# Patient Record
Sex: FEMALE | Race: BLACK OR AFRICAN AMERICAN
[De-identification: names, ages, dates, MRNs, and addresses within clinical notes are randomized per-mention and may not be internally consistent; named-entity substitution may affect disease eponyms.]

---

## 2019-10-18 ENCOUNTER — TELEPHONE (OUTPATIENT)
Dept: SCHEDULING | Age: 29
End: 2019-10-18

## 2019-10-18 ENCOUNTER — WALK IN (OUTPATIENT)
Dept: URGENT CARE | Age: 29
End: 2019-10-18

## 2019-10-18 ENCOUNTER — APPOINTMENT (OUTPATIENT)
Dept: URGENT CARE | Age: 29
End: 2019-10-18

## 2019-10-18 DIAGNOSIS — Z23 NEED FOR VACCINATION: Primary | ICD-10-CM

## 2019-10-18 PROCEDURE — 90686 IIV4 VACC NO PRSV 0.5 ML IM: CPT | Performed by: NURSE PRACTITIONER

## 2019-10-18 PROCEDURE — 90471 IMMUNIZATION ADMIN: CPT | Performed by: NURSE PRACTITIONER

## 2020-10-07 ENCOUNTER — OFFICE VISIT (OUTPATIENT)
Dept: FAMILY MEDICINE CLINIC | Facility: CLINIC | Age: 30
End: 2020-10-07
Payer: COMMERCIAL

## 2020-10-07 VITALS
SYSTOLIC BLOOD PRESSURE: 132 MMHG | WEIGHT: 142 LBS | TEMPERATURE: 98 F | HEIGHT: 59.45 IN | HEART RATE: 68 BPM | BODY MASS INDEX: 28.25 KG/M2 | DIASTOLIC BLOOD PRESSURE: 88 MMHG

## 2020-10-07 DIAGNOSIS — Z11.8 SCREENING FOR CHLAMYDIAL DISEASE: ICD-10-CM

## 2020-10-07 DIAGNOSIS — Z00.00 LABORATORY EXAMINATION ORDERED AS PART OF A ROUTINE GENERAL MEDICAL EXAMINATION: ICD-10-CM

## 2020-10-07 DIAGNOSIS — L68.0 HIRSUTISM: ICD-10-CM

## 2020-10-07 DIAGNOSIS — Z12.4 SCREENING FOR MALIGNANT NEOPLASM OF CERVIX: ICD-10-CM

## 2020-10-07 DIAGNOSIS — Z11.3 SCREENING FOR GONORRHEA: ICD-10-CM

## 2020-10-07 DIAGNOSIS — Z01.419 WELL FEMALE EXAM WITH ROUTINE GYNECOLOGICAL EXAM: Primary | ICD-10-CM

## 2020-10-07 DIAGNOSIS — Z23 NEED FOR VACCINATION: ICD-10-CM

## 2020-10-07 DIAGNOSIS — E66.3 OVERWEIGHT (BMI 25.0-29.9): ICD-10-CM

## 2020-10-07 PROCEDURE — 3008F BODY MASS INDEX DOCD: CPT | Performed by: FAMILY MEDICINE

## 2020-10-07 PROCEDURE — 3079F DIAST BP 80-89 MM HG: CPT | Performed by: FAMILY MEDICINE

## 2020-10-07 PROCEDURE — 99385 PREV VISIT NEW AGE 18-39: CPT | Performed by: FAMILY MEDICINE

## 2020-10-07 PROCEDURE — 3075F SYST BP GE 130 - 139MM HG: CPT | Performed by: FAMILY MEDICINE

## 2020-10-07 PROCEDURE — 90715 TDAP VACCINE 7 YRS/> IM: CPT | Performed by: FAMILY MEDICINE

## 2020-10-07 PROCEDURE — 90471 IMMUNIZATION ADMIN: CPT | Performed by: FAMILY MEDICINE

## 2020-10-07 NOTE — PROGRESS NOTES
HPI:   Richie Flores  is a 31year old female who presents for a complete physical exam.   Doing [facility] PT for lower back pain helping her pain. Symptoms: denies discharge, itching, burning or dysuria, periods are regular.  .   Abnormal pap co : yes.  Children: no.   Exercise: minimal.  Diet: watches fats closely, watches sugar closely and vegetarian     REVIEW OF SYSTEMS:   GENERAL: denies fevers, weakness, trouble sleeping or weight changes  SKIN: denies any unusual skin lesions or rashe done   MUSCULOSKELETAL: gait fuentes,l no gross M/S defect. EXTREMITIES: no clubbing, cyanosis, or edema  NEURO: oriented times three, cranial nerves are grossly intact, no gross motor or sensory deficit.     ASSESSMENT AND PLAN:   Richie Flores complete physical yearly.

## 2020-10-09 NOTE — PROGRESS NOTES
Negative gonorrhea and Chlamydia testing. Pap smear is normal negative HPV. Call patient she is not currently on my chart.

## 2020-10-22 NOTE — PROGRESS NOTES
Lipid, testosterone, complete metabolic panel, thyroid testing and B12. Low hemoglobin has microcytic anemia add iron level and ferritin. Start ferrous sulfate @ 325 mg (65 mg of elemental iron) orally once to  twice per day if tolerated.   Take with vi

## 2020-10-23 ENCOUNTER — TELEPHONE (OUTPATIENT)
Dept: FAMILY MEDICINE CLINIC | Facility: CLINIC | Age: 30
End: 2020-10-23

## 2020-10-23 DIAGNOSIS — D64.9 LOW HEMOGLOBIN: Primary | ICD-10-CM

## 2020-10-23 NOTE — TELEPHONE ENCOUNTER
----- Message from Gabbie Crawford PA-C sent at 10/22/2020  5:48 PM CDT -----  Lipid, testosterone, complete metabolic panel, thyroid testing and B12. Low hemoglobin has microcytic anemia add iron level and ferritin.   Start ferrous sulfate @ 325 mg (6

## 2020-10-23 NOTE — TELEPHONE ENCOUNTER
Tests added through [facility].  Spoke to patient and all recommendations given to her. VU and is in agreement.

## 2020-10-27 NOTE — PROGRESS NOTES
Iron and iron storage levels are very low.   Low hemoglobin has microcytic anemia add iron level and ferritin.  Start ferrous sulfate @ 325 mg (65 mg of elemental iron) orally once to  twice per day if tolerated.  Take with vitamin C 500 mg to help absorpti

## 2020-10-30 ENCOUNTER — TELEPHONE (OUTPATIENT)
Dept: FAMILY MEDICINE CLINIC | Facility: CLINIC | Age: 30
End: 2020-10-30

## 2020-10-30 NOTE — TELEPHONE ENCOUNTER
Per Justin Rose PA-C, the patient's signed \"Test Authorization\" was successfully faxed to [employer] at [phone].

## 2020-11-27 NOTE — TELEPHONE ENCOUNTER
CBC is improving make sure you are taking 2 of the over-the-counter iron per day with 500 mg of vitamin C..  Next set of labs will be due 1/23/2021.   Sent to my chart  Make sure there is a CBC and a ferritin for 1/23/2021 ordered for 1/2021

## 2021-02-01 ENCOUNTER — TELEMEDICINE (OUTPATIENT)
Dept: FAMILY MEDICINE CLINIC | Facility: CLINIC | Age: 31
End: 2021-02-01

## 2021-02-01 ENCOUNTER — PATIENT MESSAGE (OUTPATIENT)
Dept: FAMILY MEDICINE CLINIC | Facility: CLINIC | Age: 31
End: 2021-02-01

## 2021-02-01 VITALS — SYSTOLIC BLOOD PRESSURE: 122 MMHG | DIASTOLIC BLOOD PRESSURE: 82 MMHG

## 2021-02-01 DIAGNOSIS — M54.41 ACUTE RIGHT-SIDED LOW BACK PAIN WITH RIGHT-SIDED SCIATICA: Primary | ICD-10-CM

## 2021-02-01 DIAGNOSIS — D50.8 IRON DEFICIENCY ANEMIA SECONDARY TO INADEQUATE DIETARY IRON INTAKE: ICD-10-CM

## 2021-02-01 DIAGNOSIS — L30.9 ECZEMA, UNSPECIFIED TYPE: ICD-10-CM

## 2021-02-01 PROCEDURE — 99214 OFFICE O/P EST MOD 30 MIN: CPT | Performed by: FAMILY MEDICINE

## 2021-02-01 RX ORDER — TIZANIDINE 2 MG/1
TABLET ORAL NIGHTLY PRN
Qty: 15 TABLET | Refills: 0 | Status: SHIPPED | OUTPATIENT
Start: 2021-02-01 | End: 2021-05-12

## 2021-02-01 RX ORDER — METHYLPREDNISOLONE 4 MG/1
TABLET ORAL
Qty: 1 KIT | Refills: 0 | Status: SHIPPED | OUTPATIENT
Start: 2021-02-01 | End: 2021-05-12 | Stop reason: ALTCHOICE

## 2021-02-01 NOTE — PATIENT INSTRUCTIONS
OTC Remedies for Pain   The products below can be combined some use 1-2 others use all of them.   Solanpas patch lidocaine patch OTC   TENS unit  Medrol Dosepak for 6 days as directed with food do not take over-the-counter ibuprofen or Aleve at the same blair

## 2021-02-01 NOTE — PROGRESS NOTES
Dann Lyman is a 27year old female. HPI:   Please note that the following visit was completed using two-way, real-time interactive audio and video communication.   This has been done in good kendra to provide continuity of care in the best int which did not cause prolonged discomfort. Presently using TENS unit, heating pad and over-the-counter medications not helping.      Eczema patches on the right arm in 2 different locations 1 by her hand the other 1 by her elbow has used some over-the-count COVID-19 pandemic. Reviewed medications, problem list and allergies. GENERAL: Patient is speaking in full sentences no increased work in breathing patient is alert and orientated x3.     Psych patient's mood is normal and communication skills are good storage (ferritin) try ferrous sulfate @ 325 mg (65 mg of elemental iron) orally twice a day. If side effects lower doses (eg, 15-20 mg of elemental iron daily) may be effective and cause fewer side effects. Take with vitamin C 500 mg to help absorption.

## 2021-02-01 NOTE — TELEPHONE ENCOUNTER
From: Jamey Olszewski  To:  Moose Schaffer PA-C  Sent: 2/1/2021 11:59 AM CST  Subject: Other    Hello, this is a follow up message to Elijah Martin, I saw her this morning for a Tele-med appointment and she asked me to get my blood pressure taken tod

## 2021-02-26 ENCOUNTER — PATIENT MESSAGE (OUTPATIENT)
Dept: FAMILY MEDICINE CLINIC | Facility: CLINIC | Age: 31
End: 2021-02-26

## 2021-02-26 NOTE — TELEPHONE ENCOUNTER
From: Agustina Vásquez  To: Bertis Litten, PA-C  Sent: 2/26/2021 2:24 PM CST  Subject: Other    Hi BODØ     I am long overdue with sending you this message. The medicine you prescribed for the back pain I was experiencing, worked.  [id number] [facility]

## 2021-03-19 ENCOUNTER — PATIENT MESSAGE (OUTPATIENT)
Dept: FAMILY MEDICINE CLINIC | Facility: CLINIC | Age: 31
End: 2021-03-19

## 2021-03-19 NOTE — TELEPHONE ENCOUNTER
From: Ortiz Tan  To: Vega Alves PA-C  Sent: 3/19/2021 11:54 AM CDT  Subject: Other    Hello,  This message is for BODØ. I wanted to follow up with her regarding the UTI I believe I had.  I had been drinking water and cranberry juic

## 2021-05-08 ENCOUNTER — PATIENT MESSAGE (OUTPATIENT)
Dept: FAMILY MEDICINE CLINIC | Facility: CLINIC | Age: 31
End: 2021-05-08

## 2021-05-10 NOTE — TELEPHONE ENCOUNTER
From: Rony Palbo  To: Ruby Shankar PA-C  Sent: 5/8/2021 7:37 AM CDT  Subject: Other    Gerardo Trejo    I want to let you know, I found out I am pregnant, yesterday! I will schedule an appointment to come and see you soon.

## 2021-05-12 ENCOUNTER — OFFICE VISIT (OUTPATIENT)
Dept: FAMILY MEDICINE CLINIC | Facility: CLINIC | Age: 31
End: 2021-05-12
Payer: COMMERCIAL

## 2021-05-12 VITALS
TEMPERATURE: 98 F | DIASTOLIC BLOOD PRESSURE: 80 MMHG | HEART RATE: 84 BPM | WEIGHT: 159 LBS | HEIGHT: 59.57 IN | SYSTOLIC BLOOD PRESSURE: 110 MMHG | BODY MASS INDEX: 31.63 KG/M2

## 2021-05-12 DIAGNOSIS — Z3A.01 LESS THAN 8 WEEKS GESTATION OF PREGNANCY: ICD-10-CM

## 2021-05-12 DIAGNOSIS — N91.2 AMENORRHEA: Primary | ICD-10-CM

## 2021-05-12 PROCEDURE — 3008F BODY MASS INDEX DOCD: CPT | Performed by: FAMILY MEDICINE

## 2021-05-12 PROCEDURE — 3079F DIAST BP 80-89 MM HG: CPT | Performed by: FAMILY MEDICINE

## 2021-05-12 PROCEDURE — 99213 OFFICE O/P EST LOW 20 MIN: CPT | Performed by: FAMILY MEDICINE

## 2021-05-12 PROCEDURE — 3074F SYST BP LT 130 MM HG: CPT | Performed by: FAMILY MEDICINE

## 2021-05-12 NOTE — PATIENT INSTRUCTIONS
Exercising After Pregnancy  Exercise is great for new moms. It can make your body stronger and give you more energy. It can improve your mood and your sleep. And it can help get rid of stress.  Read on for tips on how to exercise safely after having your target certain muscle groups. These include the arms, legs, and hips. Get your healthcare provider’s OK first. Then add these kinds of activities at least 2 days a week.  Examples include:  · Kegel pelvic floor muscle exercises (these help reduce urinary an discomfort of different kinds. Below are tips for ways to feel better. Talk with your healthcare provider before using pain-relieving medicine at any time during your pregnancy. First trimester tips  Easing nausea  · Get up slowly.  Eat a few unsalted c vegetables). · Drink plenty of water. · Get regular exercise. · Ask about your healthcare provider about medicines that have docusate or psyllium. Taking care of your breasts  · Don't use harsh soaps or alcohol, which can make your skin too dry.   · Wea you which ones to avoid. Fluids  Drink at least 8 to 10 cups of fluid daily. Your baby needs fluids. Fluids also decrease constipation, flush out toxins and waste, limit swelling, and help prevent bladder infections.  Water is best. Other good choices are: questions, be sure to ask them.    Fruits Vegetables Grains & Cereals* Fats & Oils   2 cups  Examples of 1-cup servings:   1 medium apple  1 medium orange  1 medium banana  1 cup chopped fruit  1 cup 100% fruit juice (pasteurized)   1/2 cup dried fruit 2-1/ need to take 400 micrograms (mcg) of folic acid every day for the first 2 to 3 months after conception. After that, 600 mcg is needed for a growing baby and placenta.   · Iron, calcium, and vitamin D. You may also be advised to take these supplements during Always follow your healthcare professional's instructions.

## 2021-05-13 PROBLEM — Z3A.01 LESS THAN 8 WEEKS GESTATION OF PREGNANCY: Status: ACTIVE | Noted: 2021-05-13

## 2021-05-13 PROBLEM — M54.41 ACUTE RIGHT-SIDED LOW BACK PAIN WITH RIGHT-SIDED SCIATICA: Status: RESOLVED | Noted: 2021-02-01 | Resolved: 2021-05-13

## 2021-05-13 PROBLEM — Z3A.01 LESS THAN 8 WEEKS GESTATION OF PREGNANCY (HCC): Status: ACTIVE | Noted: 2021-05-13

## 2021-05-13 PROBLEM — N91.2 AMENORRHEA: Status: ACTIVE | Noted: 2021-05-13

## 2021-05-13 NOTE — PROGRESS NOTES
Dale Adan is a 27year old female. HPI:   Patient is in for amenorrhea she is accompanied with her . .  First day of last menstrual period 4/1/2021. She presently is not on any medications.   States she did a home pregnancy test and i edema  Musculoskeletal: No gross deficit  Neurological: nerves II through XII grossly intact no sensorimotor deficit  Psychological: Mood and affect are normal.  Good communication skills.   ASSESSMENT AND PLAN:   Amenorrhea  (primary encounter diagnosis)

## 2021-05-24 ENCOUNTER — PATIENT MESSAGE (OUTPATIENT)
Dept: FAMILY MEDICINE CLINIC | Facility: CLINIC | Age: 31
End: 2021-05-24

## 2021-05-24 NOTE — TELEPHONE ENCOUNTER
From: Asha Beaver  To: Al Dean PA-C  Sent: 5/24/2021 3:17 PM CDT  Subject: Non-Urgent Medical Question    Hi Shannen Harrison!!    I am having trouble finding a doctor. I had been looking around since my last visit with you.  Jana Araya

## 2021-05-25 ENCOUNTER — TELEPHONE (OUTPATIENT)
Dept: OBGYN CLINIC | Facility: CLINIC | Age: 31
End: 2021-05-25

## 2021-05-25 NOTE — TELEPHONE ENCOUNTER
Call made to patient and she asked if I could call her work phone instead. Call made to patient's work phone but unable to contact patient directly. Hold for f/u.

## 2021-05-25 NOTE — TELEPHONE ENCOUNTER
Patient calling to initiate prenatal care  LMP 04/01/21  Patient is 7-8 weeks on 05/25/21  Confirmation Ultrasound and Appointment scheduled on 6/9/21  Insurance Cinthia   Good time to return phone call Anytime

## 2021-05-27 NOTE — TELEPHONE ENCOUNTER
Call to patient; no answer. Left message to call back.        LMP: 4/1/21  EDC based on lmp: 1/6/22    8 wks on 5/27; appt on 6/9 at 9 6/7 wks

## 2021-05-28 NOTE — TELEPHONE ENCOUNTER
Patient returned call.     LMP: 21  EDC based on lmp: 22     8 wks on ; appt on  at 9  wks        Are cycles regular?: yes    Medical problems: denies    Past sx hx: breast bx, no abd surgeries    Current medications: PNV    Past preg

## 2021-06-09 ENCOUNTER — ULTRASOUND ENCOUNTER (OUTPATIENT)
Dept: OBGYN CLINIC | Facility: CLINIC | Age: 31
End: 2021-06-09
Payer: COMMERCIAL

## 2021-06-09 ENCOUNTER — OFFICE VISIT (OUTPATIENT)
Dept: OBGYN CLINIC | Facility: CLINIC | Age: 31
End: 2021-06-09
Payer: COMMERCIAL

## 2021-06-09 VITALS
SYSTOLIC BLOOD PRESSURE: 138 MMHG | WEIGHT: 161.63 LBS | HEART RATE: 80 BPM | HEIGHT: 59 IN | DIASTOLIC BLOOD PRESSURE: 90 MMHG | BODY MASS INDEX: 32.58 KG/M2

## 2021-06-09 DIAGNOSIS — N91.2 AMENORRHEA: Primary | ICD-10-CM

## 2021-06-09 PROCEDURE — 76856 US EXAM PELVIC COMPLETE: CPT | Performed by: OBSTETRICS & GYNECOLOGY

## 2021-06-09 PROCEDURE — 3008F BODY MASS INDEX DOCD: CPT | Performed by: OBSTETRICS & GYNECOLOGY

## 2021-06-09 PROCEDURE — 99203 OFFICE O/P NEW LOW 30 MIN: CPT | Performed by: OBSTETRICS & GYNECOLOGY

## 2021-06-09 PROCEDURE — 3080F DIAST BP >= 90 MM HG: CPT | Performed by: OBSTETRICS & GYNECOLOGY

## 2021-06-09 PROCEDURE — 3075F SYST BP GE 130 - 139MM HG: CPT | Performed by: OBSTETRICS & GYNECOLOGY

## 2021-06-09 RX ORDER — PRENATAL VIT/IRON FUM/FOLIC AC 27MG-0.8MG
1 TABLET ORAL DAILY
COMMUNITY

## 2021-06-09 NOTE — PROGRESS NOTES
Patient is 26 y/o     Regular menses  Cat: 10 x 28-30 x 4-5    PMH: neg  PSH: right breast fibroadenoma removed    No history oh HTN, discussed BP, will watch    LMP: 2020  =  9w 6d     EDC: 2022    Scan: viable pregnancy, 10w 1d  C/w LMP

## 2021-06-24 ENCOUNTER — INITIAL PRENATAL (OUTPATIENT)
Dept: OBGYN CLINIC | Facility: CLINIC | Age: 31
End: 2021-06-24
Payer: COMMERCIAL

## 2021-06-24 VITALS
HEART RATE: 80 BPM | SYSTOLIC BLOOD PRESSURE: 140 MMHG | WEIGHT: 161.19 LBS | BODY MASS INDEX: 32.49 KG/M2 | DIASTOLIC BLOOD PRESSURE: 80 MMHG | HEIGHT: 59 IN

## 2021-06-24 DIAGNOSIS — Z34.90 ENCOUNTER FOR SUPERVISION OF NORMAL PREGNANCY, ANTEPARTUM, UNSPECIFIED GRAVIDITY: Primary | ICD-10-CM

## 2021-06-24 DIAGNOSIS — Z34.00 SUPERVISION OF NORMAL FIRST PREGNANCY, ANTEPARTUM: ICD-10-CM

## 2021-06-24 PROBLEM — Z3A.01 LESS THAN 8 WEEKS GESTATION OF PREGNANCY: Status: RESOLVED | Noted: 2021-05-13 | Resolved: 2021-06-24

## 2021-06-24 PROBLEM — N91.2 AMENORRHEA: Status: RESOLVED | Noted: 2021-05-13 | Resolved: 2021-06-24

## 2021-06-24 PROBLEM — Z3A.01 LESS THAN 8 WEEKS GESTATION OF PREGNANCY (HCC): Status: RESOLVED | Noted: 2021-05-13 | Resolved: 2021-06-24

## 2021-06-24 PROCEDURE — 3077F SYST BP >= 140 MM HG: CPT | Performed by: OBSTETRICS & GYNECOLOGY

## 2021-06-24 PROCEDURE — 3079F DIAST BP 80-89 MM HG: CPT | Performed by: OBSTETRICS & GYNECOLOGY

## 2021-06-24 PROCEDURE — 3008F BODY MASS INDEX DOCD: CPT | Performed by: OBSTETRICS & GYNECOLOGY

## 2021-06-24 PROCEDURE — 81002 URINALYSIS NONAUTO W/O SCOPE: CPT | Performed by: OBSTETRICS & GYNECOLOGY

## 2021-07-01 LAB
ABSOLUTE BASOPHILS: 21 CELLS/UL (ref 0–200)
ABSOLUTE EOSINOPHILS: 97 CELLS/UL (ref 15–500)
ABSOLUTE LYMPHOCYTES: 2084 CELLS/UL (ref 850–3900)
ABSOLUTE MONOCYTES: 538 CELLS/UL (ref 200–950)
ABSOLUTE NEUTROPHILS: 4161 CELLS/UL (ref 1500–7800)
BASOPHILS: 0.3 %
EOSINOPHILS: 1.4 %
HEMATOCRIT: 33.6 % (ref 35–45)
HEMOGLOBIN: 11.3 G/DL (ref 11.7–15.5)
LYMPHOCYTES: 30.2 %
MCH: 30.3 PG (ref 27–33)
MCHC: 33.6 G/DL (ref 32–36)
MCV: 90.1 FL (ref 80–100)
MONOCYTES: 7.8 %
MPV: 10 FL (ref 7.5–12.5)
NEUTROPHILS: 60.3 %
PLATELET COUNT: 229 THOUSAND/UL (ref 140–400)
RDW: 15.1 % (ref 11–15)
RED BLOOD CELL COUNT: 3.73 MILLION/UL (ref 3.8–5.1)
RUBELLA ANTIBODY (IGG): 2.97 INDEX
SICKLE CELL SCREEN: NEGATIVE
WHITE BLOOD CELL COUNT: 6.9 THOUSAND/UL (ref 3.8–10.8)

## 2021-07-13 ENCOUNTER — TELEPHONE (OUTPATIENT)
Dept: FAMILY MEDICINE CLINIC | Facility: CLINIC | Age: 31
End: 2021-07-13

## 2021-07-13 NOTE — TELEPHONE ENCOUNTER
Spoke with patient. Patient states that she has had flare ups of eczema in the past.  Last week, patient started with a patch of eczema on wrist.  Throughout the week, the rash has gotten progressively worse.   She has bumps between her breasts on on the t

## 2021-07-13 NOTE — TELEPHONE ENCOUNTER
Pt sent below [employer] message and would like to know if Casandra Peace recommends her reaching out to her OB or is there anything we can do for her? Maia Alarcon, thank you for getting back to me. I may have to reach out to my OB office.  I am 4 months pregnant, a

## 2021-07-15 ENCOUNTER — TELEPHONE (OUTPATIENT)
Dept: OBGYN CLINIC | Facility: CLINIC | Age: 31
End: 2021-07-15

## 2021-07-15 NOTE — TELEPHONE ENCOUNTER
Patient is having a rash outbreak and she called her primary to treat it. She just wants to make sure the medication she was told to take is safe.  Please advise

## 2021-07-15 NOTE — TELEPHONE ENCOUNTER
15W0D called stating she saw PCP for rash and he advised Claritin or Benadryl. She wanted to make sure it was safe to take in pregnancy.    Last OV:    Pregnancy Complications: NA  Abdominal pain: N  Leaking of fluid: N  Vaginal Bleeding: N

## 2021-07-21 PROBLEM — R03.0 ELEVATED BLOOD PRESSURE READING: Status: ACTIVE | Noted: 2021-07-21

## 2021-07-22 ENCOUNTER — ROUTINE PRENATAL (OUTPATIENT)
Dept: OBGYN CLINIC | Facility: CLINIC | Age: 31
End: 2021-07-22
Payer: COMMERCIAL

## 2021-07-22 ENCOUNTER — LAB ENCOUNTER (OUTPATIENT)
Dept: LAB | Age: 31
End: 2021-07-22
Attending: OBSTETRICS & GYNECOLOGY
Payer: COMMERCIAL

## 2021-07-22 VITALS — WEIGHT: 167.19 LBS | DIASTOLIC BLOOD PRESSURE: 76 MMHG | SYSTOLIC BLOOD PRESSURE: 136 MMHG | BODY MASS INDEX: 34 KG/M2

## 2021-07-22 DIAGNOSIS — Z3A.16 16 WEEKS GESTATION OF PREGNANCY: Primary | ICD-10-CM

## 2021-07-22 DIAGNOSIS — Z34.00 SUPERVISION OF NORMAL FIRST PREGNANCY: Primary | ICD-10-CM

## 2021-07-22 LAB
GLUCOSE (URINE DIPSTICK): NEGATIVE MG/DL
MULTISTIX LOT#: ABNORMAL NUMERIC
PROTEIN (URINE DIPSTICK): 30 MG/DL

## 2021-07-22 PROCEDURE — 81002 URINALYSIS NONAUTO W/O SCOPE: CPT | Performed by: OBSTETRICS & GYNECOLOGY

## 2021-07-22 PROCEDURE — 3078F DIAST BP <80 MM HG: CPT | Performed by: OBSTETRICS & GYNECOLOGY

## 2021-07-22 PROCEDURE — 3075F SYST BP GE 130 - 139MM HG: CPT | Performed by: OBSTETRICS & GYNECOLOGY

## 2021-07-22 NOTE — PROGRESS NOTES
Patient presents with:  Prenatal Care: AMI     Routine prenatal visit complaining of rash on arms and abdomen. Seeing dermatologist but no diagnosis as of yet. Patient denies any bleeding, leaking fluid, cramping, or contractions. Good fetal movement.   A

## 2021-07-27 ENCOUNTER — TELEPHONE (OUTPATIENT)
Dept: OBGYN CLINIC | Facility: CLINIC | Age: 31
End: 2021-07-27

## 2021-07-28 NOTE — TELEPHONE ENCOUNTER
Patient informed of normal results. She is having a gender reveal and asked if we could leave detailed message with her friend, Joselyn Graves. Left detailed message at [phone]. To call back w/ any questions or concerns.      In addition patient was seen a

## 2021-08-14 ENCOUNTER — MED REC SCAN ONLY (OUTPATIENT)
Dept: OBGYN CLINIC | Facility: CLINIC | Age: 31
End: 2021-08-14

## 2021-08-19 ENCOUNTER — ROUTINE PRENATAL (OUTPATIENT)
Dept: OBGYN CLINIC | Facility: CLINIC | Age: 31
End: 2021-08-19
Payer: COMMERCIAL

## 2021-08-19 ENCOUNTER — ULTRASOUND ENCOUNTER (OUTPATIENT)
Dept: OBGYN CLINIC | Facility: CLINIC | Age: 31
End: 2021-08-19
Payer: COMMERCIAL

## 2021-08-19 VITALS
WEIGHT: 166.81 LBS | DIASTOLIC BLOOD PRESSURE: 80 MMHG | HEIGHT: 59 IN | SYSTOLIC BLOOD PRESSURE: 120 MMHG | BODY MASS INDEX: 33.63 KG/M2

## 2021-08-19 DIAGNOSIS — Z34.00 SUPERVISION OF NORMAL FIRST PREGNANCY, ANTEPARTUM: Primary | ICD-10-CM

## 2021-08-19 LAB
GLUCOSE (URINE DIPSTICK): NEGATIVE MG/DL
MULTISTIX LOT#: NORMAL NUMERIC

## 2021-08-19 PROCEDURE — 3079F DIAST BP 80-89 MM HG: CPT | Performed by: OBSTETRICS & GYNECOLOGY

## 2021-08-19 PROCEDURE — 81002 URINALYSIS NONAUTO W/O SCOPE: CPT | Performed by: OBSTETRICS & GYNECOLOGY

## 2021-08-19 PROCEDURE — 3074F SYST BP LT 130 MM HG: CPT | Performed by: OBSTETRICS & GYNECOLOGY

## 2021-08-19 PROCEDURE — 3008F BODY MASS INDEX DOCD: CPT | Performed by: OBSTETRICS & GYNECOLOGY

## 2021-08-19 PROCEDURE — 76805 OB US >/= 14 WKS SNGL FETUS: CPT | Performed by: OBSTETRICS & GYNECOLOGY

## 2021-08-19 NOTE — PATIENT INSTRUCTIONS
Medications Safe in Pregnancy  The following over-the-counter medications may be taken safely after 12 weeks gestation by any patient who is pregnant. Please follow the instructions on the package for adult dosage.   If you experience any symptoms prior to had significant outbreaks of pertussis (whooping cough) for the last few years. Therefore, the CDC recommends that all pregnant women receive a Tdap vaccine during pregnancy, regardless of whether you have received one previously.   The vaccine reduces you Here are some facts to consider when you are making a decision:   [1] Pregnant women are at higher risk for acquiring Covid-19 infection and have a subsequent higher risk for the following:  - severe illness  - adverse pregnancy outcomes including pret

## 2021-08-19 NOTE — PROGRESS NOTES
AMI    GA: 20w0d   21  1312   BP: 120/80   Weight: 166 lb 12.8 oz (75.7 kg)   Height: 59\"       Doing well  No complaints.  Denies LOF/VB/uctx  RH positive  Genetic testing cffDNA neg, declines MSAFP   Anatomy Scan  unremarkable   CBC and

## 2021-08-26 ENCOUNTER — TELEPHONE (OUTPATIENT)
Dept: OBGYN CLINIC | Facility: CLINIC | Age: 31
End: 2021-08-26

## 2021-08-26 NOTE — TELEPHONE ENCOUNTER
G1/P 0 GA 21 wks patient complaining of abdominal pain. She states that her pain has improved. States that she believes it was gas. Symptoms improved after using the restroom.    Last OV: 8/19/21 tyron with Dr. Kun Santana    Pregnancy Complications: elevated BMI,

## 2021-08-26 NOTE — TELEPHONE ENCOUNTER
Patient called with gas or cramping. She would like to talk to a nurse to see what she can do.  Please advise

## 2021-09-11 ENCOUNTER — IMMUNIZATION (OUTPATIENT)
Dept: LAB | Facility: HOSPITAL | Age: 31
End: 2021-09-11
Attending: EMERGENCY MEDICINE
Payer: COMMERCIAL

## 2021-09-11 DIAGNOSIS — Z23 NEED FOR VACCINATION: Primary | ICD-10-CM

## 2021-09-11 PROCEDURE — 0001A SARSCOV2 VAC 30MCG/0.3ML IM: CPT

## 2021-09-16 ENCOUNTER — ROUTINE PRENATAL (OUTPATIENT)
Dept: OBGYN CLINIC | Facility: CLINIC | Age: 31
End: 2021-09-16
Payer: COMMERCIAL

## 2021-09-16 VITALS
HEIGHT: 59 IN | DIASTOLIC BLOOD PRESSURE: 84 MMHG | WEIGHT: 172 LBS | BODY MASS INDEX: 34.68 KG/M2 | SYSTOLIC BLOOD PRESSURE: 128 MMHG

## 2021-09-16 DIAGNOSIS — Z34.00 SUPERVISION OF NORMAL FIRST PREGNANCY, ANTEPARTUM: Primary | ICD-10-CM

## 2021-09-16 LAB
GLUCOSE (URINE DIPSTICK): NEGATIVE MG/DL
MULTISTIX LOT#: NORMAL NUMERIC
PROTEIN (URINE DIPSTICK): NEGATIVE MG/DL

## 2021-09-16 PROCEDURE — 3074F SYST BP LT 130 MM HG: CPT | Performed by: OBSTETRICS & GYNECOLOGY

## 2021-09-16 PROCEDURE — 3008F BODY MASS INDEX DOCD: CPT | Performed by: OBSTETRICS & GYNECOLOGY

## 2021-09-16 PROCEDURE — 81002 URINALYSIS NONAUTO W/O SCOPE: CPT | Performed by: OBSTETRICS & GYNECOLOGY

## 2021-09-16 PROCEDURE — 3079F DIAST BP 80-89 MM HG: CPT | Performed by: OBSTETRICS & GYNECOLOGY

## 2021-09-16 NOTE — PROGRESS NOTES
AMI - 24w0d  Doing well. Some pelvic pain when she is lying down and tossing and turning. Denies LOF/VB/uctx. +FM.    /84   Ht 59\"   Wt 172 lb (78 kg)   LMP 04/01/2021 (Exact Date)   BMI 34.74 kg/m²   RH positive  1 hr glucose and CBC reviewed to

## 2021-10-02 ENCOUNTER — IMMUNIZATION (OUTPATIENT)
Dept: LAB | Facility: HOSPITAL | Age: 31
End: 2021-10-02
Attending: EMERGENCY MEDICINE
Payer: COMMERCIAL

## 2021-10-02 DIAGNOSIS — Z23 NEED FOR VACCINATION: Primary | ICD-10-CM

## 2021-10-02 PROCEDURE — 0002A SARSCOV2 VAC 30MCG/0.3ML IM: CPT

## 2021-10-07 ENCOUNTER — ROUTINE PRENATAL (OUTPATIENT)
Dept: OBGYN CLINIC | Facility: CLINIC | Age: 31
End: 2021-10-07
Payer: COMMERCIAL

## 2021-10-07 VITALS — BODY MASS INDEX: 35 KG/M2 | DIASTOLIC BLOOD PRESSURE: 60 MMHG | WEIGHT: 173.63 LBS | SYSTOLIC BLOOD PRESSURE: 110 MMHG

## 2021-10-07 DIAGNOSIS — N89.8 VAGINAL LEUKORRHEA: ICD-10-CM

## 2021-10-07 DIAGNOSIS — Z3A.27 27 WEEKS GESTATION OF PREGNANCY: Primary | ICD-10-CM

## 2021-10-07 PROCEDURE — 3074F SYST BP LT 130 MM HG: CPT | Performed by: NURSE PRACTITIONER

## 2021-10-07 PROCEDURE — 3078F DIAST BP <80 MM HG: CPT | Performed by: NURSE PRACTITIONER

## 2021-10-07 PROCEDURE — 81002 URINALYSIS NONAUTO W/O SCOPE: CPT | Performed by: NURSE PRACTITIONER

## 2021-10-07 NOTE — PROGRESS NOTES
AMI  Doing well  FM is good  Odor to her discharge, vaginitis panel sent  RH positive  HIV ordered  TDAP recommended   RTC 4wks

## 2021-10-25 ENCOUNTER — ROUTINE PRENATAL (OUTPATIENT)
Dept: OBGYN CLINIC | Facility: CLINIC | Age: 31
End: 2021-10-25
Payer: COMMERCIAL

## 2021-10-25 VITALS — SYSTOLIC BLOOD PRESSURE: 130 MMHG | WEIGHT: 175.19 LBS | DIASTOLIC BLOOD PRESSURE: 70 MMHG | BODY MASS INDEX: 35 KG/M2

## 2021-10-25 DIAGNOSIS — Z34.90 PRENATAL CARE, ANTEPARTUM: Primary | ICD-10-CM

## 2021-10-25 PROCEDURE — 3075F SYST BP GE 130 - 139MM HG: CPT | Performed by: OBSTETRICS & GYNECOLOGY

## 2021-10-25 PROCEDURE — 81002 URINALYSIS NONAUTO W/O SCOPE: CPT | Performed by: OBSTETRICS & GYNECOLOGY

## 2021-10-25 PROCEDURE — 3078F DIAST BP <80 MM HG: CPT | Performed by: OBSTETRICS & GYNECOLOGY

## 2021-10-25 NOTE — PROGRESS NOTES
Patient presents with:  Prenatal Care: tyron    Routine prenatal visit. Patient without complaints. Good fetal movement  Patient denies any bleeding, leaking fluid, cramping, or contractions. Assessment/Plan:  29w4d doing well  1 hour GTT, CBC done.   HI

## 2021-11-09 ENCOUNTER — ROUTINE PRENATAL (OUTPATIENT)
Dept: OBGYN CLINIC | Facility: CLINIC | Age: 31
End: 2021-11-09
Payer: COMMERCIAL

## 2021-11-09 VITALS
HEIGHT: 59 IN | BODY MASS INDEX: 35.68 KG/M2 | SYSTOLIC BLOOD PRESSURE: 120 MMHG | DIASTOLIC BLOOD PRESSURE: 80 MMHG | WEIGHT: 177 LBS

## 2021-11-09 DIAGNOSIS — Z3A.27 27 WEEKS GESTATION OF PREGNANCY: Primary | ICD-10-CM

## 2021-11-09 PROCEDURE — 81002 URINALYSIS NONAUTO W/O SCOPE: CPT | Performed by: NURSE PRACTITIONER

## 2021-11-09 PROCEDURE — 3008F BODY MASS INDEX DOCD: CPT | Performed by: NURSE PRACTITIONER

## 2021-11-09 PROCEDURE — 3079F DIAST BP 80-89 MM HG: CPT | Performed by: NURSE PRACTITIONER

## 2021-11-09 PROCEDURE — 3074F SYST BP LT 130 MM HG: CPT | Performed by: NURSE PRACTITIONER

## 2021-11-09 NOTE — PROGRESS NOTES
AMI  Doing well,feeling more tired. Has not yet treated BV, nervous about medication. Encouraged she do test spot.   GOOD FM  Denies VB/LOF/uctx  Encouraged she complete her HIV  RTC in 2 wks  Fetal movement discussed

## 2021-11-30 ENCOUNTER — ROUTINE PRENATAL (OUTPATIENT)
Dept: OBGYN CLINIC | Facility: CLINIC | Age: 31
End: 2021-11-30
Payer: COMMERCIAL

## 2021-11-30 ENCOUNTER — HOSPITAL ENCOUNTER (INPATIENT)
Facility: HOSPITAL | Age: 31
LOS: 7 days | Discharge: HOME | End: 2021-12-07
Attending: OBSTETRICS & GYNECOLOGY | Admitting: OBSTETRICS & GYNECOLOGY
Payer: COMMERCIAL

## 2021-11-30 VITALS
WEIGHT: 189 LBS | BODY MASS INDEX: 38.1 KG/M2 | DIASTOLIC BLOOD PRESSURE: 100 MMHG | HEIGHT: 59 IN | SYSTOLIC BLOOD PRESSURE: 142 MMHG

## 2021-11-30 DIAGNOSIS — Z3A.34 34 WEEKS GESTATION OF PREGNANCY: Primary | ICD-10-CM

## 2021-11-30 DIAGNOSIS — R03.0 ELEVATED BLOOD PRESSURE READING: ICD-10-CM

## 2021-11-30 DIAGNOSIS — O13.9 GESTATIONAL HYPERTENSION, ANTEPARTUM: ICD-10-CM

## 2021-11-30 DIAGNOSIS — O36.5930 POOR FETAL GROWTH AFFECTING MANAGEMENT OF MOTHER IN THIRD TRIMESTER, SINGLE OR UNSPECIFIED FETUS: Primary | ICD-10-CM

## 2021-11-30 PROBLEM — Z34.90 PREGNANCY: Status: ACTIVE | Noted: 2021-11-30

## 2021-11-30 PROBLEM — Z34.90 PREGNANCY (HCC): Status: ACTIVE | Noted: 2021-11-30

## 2021-11-30 PROCEDURE — 3077F SYST BP >= 140 MM HG: CPT | Performed by: OBSTETRICS & GYNECOLOGY

## 2021-11-30 PROCEDURE — 99222 1ST HOSP IP/OBS MODERATE 55: CPT | Performed by: OBSTETRICS & GYNECOLOGY

## 2021-11-30 PROCEDURE — 3008F BODY MASS INDEX DOCD: CPT | Performed by: OBSTETRICS & GYNECOLOGY

## 2021-11-30 PROCEDURE — 81002 URINALYSIS NONAUTO W/O SCOPE: CPT | Performed by: OBSTETRICS & GYNECOLOGY

## 2021-11-30 PROCEDURE — 3080F DIAST BP >= 90 MM HG: CPT | Performed by: OBSTETRICS & GYNECOLOGY

## 2021-11-30 RX ORDER — CHOLECALCIFEROL (VITAMIN D3) 25 MCG
1 TABLET,CHEWABLE ORAL DAILY
Status: DISCONTINUED | OUTPATIENT
Start: 2021-11-30 | End: 2021-12-03

## 2021-11-30 RX ORDER — ZOLPIDEM TARTRATE 5 MG/1
5 TABLET ORAL NIGHTLY PRN
Status: DISCONTINUED | OUTPATIENT
Start: 2021-11-30 | End: 2021-12-03

## 2021-11-30 RX ORDER — SODIUM CHLORIDE, SODIUM LACTATE, POTASSIUM CHLORIDE, CALCIUM CHLORIDE 600; 310; 30; 20 MG/100ML; MG/100ML; MG/100ML; MG/100ML
INJECTION, SOLUTION INTRAVENOUS CONTINUOUS
Status: DISCONTINUED | OUTPATIENT
Start: 2021-11-30 | End: 2021-12-03

## 2021-11-30 RX ORDER — BETAMETHASONE SODIUM PHOSPHATE AND BETAMETHASONE ACETATE 3; 3 MG/ML; MG/ML
12 INJECTION, SUSPENSION INTRA-ARTICULAR; INTRALESIONAL; INTRAMUSCULAR; SOFT TISSUE EVERY 24 HOURS
Status: COMPLETED | OUTPATIENT
Start: 2021-11-30 | End: 2021-12-01

## 2021-11-30 RX ORDER — CALCIUM CARBONATE 200(500)MG
1000 TABLET,CHEWABLE ORAL
Status: DISCONTINUED | OUTPATIENT
Start: 2021-11-30 | End: 2021-12-03

## 2021-11-30 RX ORDER — ACETAMINOPHEN 500 MG
500 TABLET ORAL EVERY 6 HOURS PRN
Status: DISCONTINUED | OUTPATIENT
Start: 2021-11-30 | End: 2021-12-03

## 2021-11-30 RX ORDER — DOCUSATE SODIUM 100 MG/1
100 CAPSULE, LIQUID FILLED ORAL 2 TIMES DAILY
Status: DISCONTINUED | OUTPATIENT
Start: 2021-11-30 | End: 2021-12-03

## 2021-11-30 RX ORDER — ACETAMINOPHEN 500 MG
1000 TABLET ORAL EVERY 6 HOURS PRN
Status: DISCONTINUED | OUTPATIENT
Start: 2021-11-30 | End: 2021-12-03

## 2021-11-30 NOTE — PROGRESS NOTES
Pt ambulatory to triage 2 for elevated bp's and to rule out PIH. Triage process explained and pt verbalzied understanding.

## 2021-11-30 NOTE — PROGRESS NOTES
No C/O, BP high  Good FM  No other PIH symptoms  Edema, weight gain  To L&D for [facility] labs and monitoring

## 2021-12-01 ENCOUNTER — ULTRASOUND ENCOUNTER (OUTPATIENT)
Dept: PERINATAL CARE | Facility: HOSPITAL | Age: 31
End: 2021-12-01
Attending: OBSTETRICS & GYNECOLOGY
Payer: COMMERCIAL

## 2021-12-01 DIAGNOSIS — O13.9 GESTATIONAL HYPERTENSION, ANTEPARTUM: ICD-10-CM

## 2021-12-01 DIAGNOSIS — R03.0 ELEVATED BLOOD PRESSURE READING: ICD-10-CM

## 2021-12-01 PROBLEM — O36.5930 POOR FETAL GROWTH AFFECTING MANAGEMENT OF MOTHER IN THIRD TRIMESTER: Status: ACTIVE | Noted: 2021-12-01

## 2021-12-01 PROBLEM — O36.5930 POOR FETAL GROWTH AFFECTING MANAGEMENT OF MOTHER IN THIRD TRIMESTER (HCC): Status: ACTIVE | Noted: 2021-12-01

## 2021-12-01 PROCEDURE — 76820 UMBILICAL ARTERY ECHO: CPT

## 2021-12-01 PROCEDURE — 76811 OB US DETAILED SNGL FETUS: CPT | Performed by: OBSTETRICS & GYNECOLOGY

## 2021-12-01 PROCEDURE — 59025 FETAL NON-STRESS TEST: CPT | Performed by: OBSTETRICS & GYNECOLOGY

## 2021-12-01 PROCEDURE — 76819 FETAL BIOPHYS PROFIL W/O NST: CPT

## 2021-12-01 PROCEDURE — 99232 SBSQ HOSP IP/OBS MODERATE 35: CPT | Performed by: OBSTETRICS & GYNECOLOGY

## 2021-12-01 NOTE — H&P
[facility]  Obstetrics and Gynecology  History & Physical    Obed Ronald Patient Status:  Observation    1990 MRN [id number]   Location [facility] Attending Juan Cheung [facility] Day 0 PCP José Miguel Sofia of Onset   • Hypertension Mother    • Other (History of being pre-diabetic) Father    • Seizure Disorder Brother         Epilepsy   • Heart Attack Maternal Grandmother    • Prostate Cancer Maternal Grandfather         in remission    • Cancer Paternal [facility] [place] 11/30/2021     11/30/2021    CO2 22.0 11/30/2021    GLU 86 11/30/2021    CA 9.0 11/30/2021    ALB 2.4 11/30/2021    ALKPHO 105 11/30/2021    BILT 0.3 11/30/2021    TP 6.5 11/30/2021    AST 19 11/30/2021    ALT 21 11/30/2021     PCR 0.15 mg  ASSESSMEN

## 2021-12-01 NOTE — CONSULTS
[facility]  Maternal-Fetal Medicine Inpatient Consultation    Date of Admission:  11/30/2021  Date of Consult:  12/1/2021    Reason for Consult:   A maternal-fetal medicine consultation was requested secondary to Hypertension.     History of P Oral, Daily  •  betamethasone sod phos & acet (CELESTONE) 6 (3-3) MG/ML injection 12 mg, 12 mg, Intramuscular, Q24H  HISTORY:  Past Medical History:   Diagnosis Date   • Fibroadenoma of breast, right    • Pregnancy-induced hypertension       Past Surgical K 3.8 11/30/2021     11/30/2021    CO2 22.0 11/30/2021    GLU 86 11/30/2021    CA 9.0 11/30/2021    ALB 2.4 11/30/2021    ALKPHO 105 11/30/2021    BILT 0.3 11/30/2021    TP 6.5 11/30/2021    AST 19 11/30/2021    ALT 21 11/30/2021       HIV Antigen (cardiac and neural tube), and fetal demise and possible need for  delivery. The signs and symptoms of preeclampsia were discussed. We also discussed the potential risks and benefits of low dose aspirin and anti-hypertensive medication.   She unde hypertension. It is likely to progress to preeclampsia when diagnosed prior to 35 weeks. BP usually returns to normal after about 12 weeks postpartum. If BP is >855  Or >926 diastolic, it is consider severe and treated like severe preeclampsia.     FETAL middle cerebral artery Dopplers and uterine artery Dopplers) have not adequately demonstrated benefit in improving outcomes of pregnancies complicated by FGR .     We discussed likely association of fetal growth restriction with her HTN>  The etiology of he

## 2021-12-02 ENCOUNTER — ANESTHESIA (OUTPATIENT)
Dept: OBGYN UNIT | Facility: HOSPITAL | Age: 31
End: 2021-12-02
Payer: COMMERCIAL

## 2021-12-02 ENCOUNTER — ANESTHESIA EVENT (OUTPATIENT)
Dept: OBGYN UNIT | Facility: HOSPITAL | Age: 31
End: 2021-12-02
Payer: COMMERCIAL

## 2021-12-02 ENCOUNTER — ULTRASOUND ENCOUNTER (OUTPATIENT)
Dept: PERINATAL CARE | Facility: HOSPITAL | Age: 31
End: 2021-12-02
Attending: OBSTETRICS & GYNECOLOGY
Payer: COMMERCIAL

## 2021-12-02 DIAGNOSIS — R03.0 ELEVATED BLOOD PRESSURE READING: ICD-10-CM

## 2021-12-02 DIAGNOSIS — O13.9 GESTATIONAL HYPERTENSION, ANTEPARTUM: ICD-10-CM

## 2021-12-02 DIAGNOSIS — O36.5930 POOR FETAL GROWTH AFFECTING MANAGEMENT OF MOTHER IN THIRD TRIMESTER, SINGLE OR UNSPECIFIED FETUS: ICD-10-CM

## 2021-12-02 PROBLEM — O41.00X0 OLIGOHYDRAMNIOS ANTEPARTUM: Status: ACTIVE | Noted: 2021-12-02

## 2021-12-02 PROBLEM — O41.00X0 OLIGOHYDRAMNIOS ANTEPARTUM (HCC): Status: ACTIVE | Noted: 2021-12-02

## 2021-12-02 PROCEDURE — 76819 FETAL BIOPHYS PROFIL W/O NST: CPT | Performed by: OBSTETRICS & GYNECOLOGY

## 2021-12-02 PROCEDURE — 59025 FETAL NON-STRESS TEST: CPT | Performed by: OBSTETRICS & GYNECOLOGY

## 2021-12-02 PROCEDURE — 99233 SBSQ HOSP IP/OBS HIGH 50: CPT | Performed by: OBSTETRICS & GYNECOLOGY

## 2021-12-02 PROCEDURE — 3E033VJ INTRODUCTION OF OTHER HORMONE INTO PERIPHERAL VEIN, PERCUTANEOUS APPROACH: ICD-10-PCS | Performed by: OBSTETRICS & GYNECOLOGY

## 2021-12-02 PROCEDURE — 76815 OB US LIMITED FETUS(S): CPT | Performed by: OBSTETRICS & GYNECOLOGY

## 2021-12-02 PROCEDURE — 76815 OB US LIMITED FETUS(S): CPT

## 2021-12-02 RX ORDER — BUPIVACAINE HCL/0.9 % NACL/PF 0.25 %
5 PLASTIC BAG, INJECTION (ML) EPIDURAL AS NEEDED
Status: DISCONTINUED | OUTPATIENT
Start: 2021-12-02 | End: 2021-12-03

## 2021-12-02 RX ORDER — AMMONIA INHALANTS 0.04 G/.3ML
0.3 INHALANT RESPIRATORY (INHALATION) AS NEEDED
Status: DISCONTINUED | OUTPATIENT
Start: 2021-12-02 | End: 2021-12-03 | Stop reason: HOSPADM

## 2021-12-02 RX ORDER — NALBUPHINE HCL 10 MG/ML
2.5 AMPUL (ML) INJECTION
Status: DISCONTINUED | OUTPATIENT
Start: 2021-12-02 | End: 2021-12-03

## 2021-12-02 RX ORDER — IBUPROFEN 600 MG/1
600 TABLET ORAL EVERY 6 HOURS PRN
Status: DISCONTINUED | OUTPATIENT
Start: 2021-12-02 | End: 2021-12-03

## 2021-12-02 RX ORDER — DEXTROSE, SODIUM CHLORIDE, SODIUM LACTATE, POTASSIUM CHLORIDE, AND CALCIUM CHLORIDE 5; .6; .31; .03; .02 G/100ML; G/100ML; G/100ML; G/100ML; G/100ML
INJECTION, SOLUTION INTRAVENOUS AS NEEDED
Status: DISCONTINUED | OUTPATIENT
Start: 2021-12-02 | End: 2021-12-03 | Stop reason: HOSPADM

## 2021-12-02 RX ORDER — ACETAMINOPHEN 500 MG
500 TABLET ORAL EVERY 6 HOURS PRN
Status: DISCONTINUED | OUTPATIENT
Start: 2021-12-02 | End: 2021-12-03

## 2021-12-02 RX ORDER — TERBUTALINE SULFATE 1 MG/ML
0.25 INJECTION, SOLUTION SUBCUTANEOUS AS NEEDED
Status: DISCONTINUED | OUTPATIENT
Start: 2021-12-02 | End: 2021-12-03 | Stop reason: HOSPADM

## 2021-12-02 RX ORDER — ONDANSETRON 2 MG/ML
4 INJECTION INTRAMUSCULAR; INTRAVENOUS EVERY 6 HOURS PRN
Status: DISCONTINUED | OUTPATIENT
Start: 2021-12-02 | End: 2021-12-03 | Stop reason: HOSPADM

## 2021-12-02 RX ORDER — SODIUM CHLORIDE, SODIUM LACTATE, POTASSIUM CHLORIDE, CALCIUM CHLORIDE 600; 310; 30; 20 MG/100ML; MG/100ML; MG/100ML; MG/100ML
INJECTION, SOLUTION INTRAVENOUS CONTINUOUS
Status: DISCONTINUED | OUTPATIENT
Start: 2021-12-02 | End: 2021-12-03 | Stop reason: HOSPADM

## 2021-12-02 RX ORDER — TRISODIUM CITRATE DIHYDRATE AND CITRIC ACID MONOHYDRATE 500; 334 MG/5ML; MG/5ML
30 SOLUTION ORAL AS NEEDED
Status: DISCONTINUED | OUTPATIENT
Start: 2021-12-02 | End: 2021-12-03 | Stop reason: HOSPADM

## 2021-12-02 NOTE — PROGRESS NOTES
Jennifer Jamal balloon placed successfully without difficulty.  80cc in uterine balloon and 50cc in vaginal.

## 2021-12-02 NOTE — PROGRESS NOTES
Dr. Engle Kicks at bedside to insert cook balloon for IOL. POC for placement is discussed including risks and expectations. Patient accompanied by her mother. Patient verbalizes understanding.

## 2021-12-02 NOTE — PROGRESS NOTES
[facility]  Progress Note    Dustin Meléndez Patient Status:  Inpatient    1990 MRN [id number]   Location [address] [facility] Attending Chante Kunz MD   [facility] Day # 2 PCP Deisy Sanchez DO     Subjective:  Dustin Meléndez Globulin 4.1   URIC ACID 5.6      11/30/2021 15:32   WBC 7.1   Hemoglobin 11.0 (L)   Hematocrit 31.8 (L)   Platelet Count 838.6      12/1/2021 17:14   PROTEIN URINE CALC 145. 6         Assessment:  Patient Active Problem List:     Hirsutism     Overweight

## 2021-12-02 NOTE — PAYOR COMM NOTE
--------------  ADMISSION REVIEW     Payor: Vern Barrera #:  [id number]  Authorization Number: [id number]    Admit date: 11/30/21  Admit time:  3:29 PM       REVIEW DOCUMENTATION:  ED Provider Notes    No notes of this type exist for th Term  AB Living   1 0 0 0 0 0   SAB IAB Ectopic Multiple Live Births   0 0 0 0 0      # Outcome Date GA Lbr Dorian/2nd Weight Sex Delivery Anes PTL Lv   1 Current              Past Medical History:   Past Medical History:   Diagnosis Date   • Fibroaden bilaterally, Tamanna's sign negative bilaterally     FHT: moderate variability/140 BPM / Positive accelerations/Negative decelerations   TOCO: acontractile    BSUS: cephalic, MINH 8    Prenatal Labs Brief Review   Blood Type:   Lab Results   Component Value D ADMINISTERED IN LAST 1 DAY:  betamethasone sod phos & acet (CELESTONE) 6 (3-3) MG/ML injection 12 mg     Date Action Dose Route User    12/1/2021 3604 Given 12 mg Intramuscular (Right Upper Outer Gluteus) Radha Gregory, RN      lactated ringers infusion headaches.        PHYSICAL EXAMINATION:  BP (!) 132/91   Pulse 79   Temp 98.3 °F (36.8 °C) (Oral)   Resp 18   Ht 4' 11\" (1.499 m)   Wt 189 lb (85.7 kg)   LMP 04/01/2021 (Exact Date)   BMI 38.17 kg/m²   Physical Exam  Constitutional:       Appearance: Norm evidence of markers for aneuploidy are seen. She understands that ultrasound exam cannot exclude genetic abnormalities and that genetic testing is recommended. The limitations of ultrasound were discussed.  BPP 8/8  See PACS/Imaging Tab For Complete Kiran Bias progression to more severe disease; use of any drug in this setting is not recommended.         Continued medication use and home blood pressure assessments were reviewed as well as recommendations for serial growth ultrasounds and antepartum testing.    age.     Classification  Timing  · Early onset: < 32 weeks  · Late-onset: ? 32 weeks     Severity  · Severe: EFW<3%     Management  General Considerations  Management of FGR is based on early diagnosis, optimal fetal surveillance, and timely delivery that would like to know if her hypertension is worsening.   If she develops severe range BP or symptoms, then delivery would be indicated.           IMPRESSION:   IUP at 34w6d   Obesity  Worsening GHTN vs CHTN  IUGR (EFW 11%, aC 2%)  Suspect oligohydramnios    betamethasone.       OB/GYN NOTE  Hosp Day # 2 PCP Josue Del Rosario DO      Subjective:  Jordin Clay is a 27year old  at 35w0d admitted for elevated BP in the setting of likely chronic HTN.  Pregnancy also complicated by IUGR and oligohydra IOL today due to IUGR and oligohydramnios.  Low threshold for  if fetal intolerance to labor.  - fetal wellbeing                - CEFM, reactive and cat 1 this morning, but a couple prolonged decels during admission               - s/p BMZ for feta

## 2021-12-02 NOTE — PROGRESS NOTES
[address] [facility]  Obstetrics and Gynecology    OB/GYN: Antepartum Progress Note     SUBJECTIVE:  Patient is a 27year old  female at [address] [address] admitted for elevated blood pressure and suspected GHTN vs CHTN. Doing well.    No HA, RUQ pain or visu

## 2021-12-02 NOTE — CONSULTS
[facility]    Maternal Fetal Medicine Progress Note    Giovanna Roque Patient Status:  Inpatient    1990 MRN [id number]   Location [address] [facility] Attending Brenda German MD   [facility] Day # 2 PCP Candie Patrick DO     SUBJ

## 2021-12-02 NOTE — IMAGING NOTE
Ultrasound Findings:  Single IUP in cephalic presentation. Placenta is posterior. Cardiac activity is present at 123 bpm  MVP is 2.2 cm . MINH 4.9 cm  BPP is 8/8.

## 2021-12-03 PROCEDURE — 10H07YZ INSERTION OF OTHER DEVICE INTO PRODUCTS OF CONCEPTION, VIA NATURAL OR ARTIFICIAL OPENING: ICD-10-PCS | Performed by: OBSTETRICS & GYNECOLOGY

## 2021-12-03 PROCEDURE — 59400 OBSTETRICAL CARE: CPT | Performed by: OBSTETRICS & GYNECOLOGY

## 2021-12-03 RX ORDER — HYDRALAZINE HYDROCHLORIDE 20 MG/ML
INJECTION INTRAMUSCULAR; INTRAVENOUS
Status: COMPLETED
Start: 2021-12-03 | End: 2021-12-03

## 2021-12-03 RX ORDER — BISACODYL 10 MG
10 SUPPOSITORY, RECTAL RECTAL ONCE AS NEEDED
Status: DISCONTINUED | OUTPATIENT
Start: 2021-12-03 | End: 2021-12-07

## 2021-12-03 RX ORDER — IBUPROFEN 600 MG/1
600 TABLET ORAL EVERY 6 HOURS
Status: DISCONTINUED | OUTPATIENT
Start: 2021-12-03 | End: 2021-12-07

## 2021-12-03 RX ORDER — ACETAMINOPHEN 325 MG/1
650 TABLET ORAL EVERY 6 HOURS PRN
Status: DISCONTINUED | OUTPATIENT
Start: 2021-12-03 | End: 2021-12-07

## 2021-12-03 RX ORDER — HYDRALAZINE HYDROCHLORIDE 20 MG/ML
5 INJECTION INTRAMUSCULAR; INTRAVENOUS ONCE
Status: COMPLETED | OUTPATIENT
Start: 2021-12-03 | End: 2021-12-03

## 2021-12-03 RX ORDER — LABETALOL HYDROCHLORIDE 5 MG/ML
INJECTION, SOLUTION INTRAVENOUS
Status: COMPLETED
Start: 2021-12-03 | End: 2021-12-03

## 2021-12-03 RX ORDER — LABETALOL 200 MG/1
200 TABLET, FILM COATED ORAL EVERY 12 HOURS SCHEDULED
Status: DISCONTINUED | OUTPATIENT
Start: 2021-12-03 | End: 2021-12-04

## 2021-12-03 RX ORDER — LABETALOL HYDROCHLORIDE 5 MG/ML
20 INJECTION, SOLUTION INTRAVENOUS ONCE
Status: COMPLETED | OUTPATIENT
Start: 2021-12-03 | End: 2021-12-03

## 2021-12-03 RX ORDER — SIMETHICONE 80 MG
80 TABLET,CHEWABLE ORAL 3 TIMES DAILY PRN
Status: DISCONTINUED | OUTPATIENT
Start: 2021-12-03 | End: 2021-12-07

## 2021-12-03 RX ORDER — DOCUSATE SODIUM 100 MG/1
100 CAPSULE, LIQUID FILLED ORAL
Status: DISCONTINUED | OUTPATIENT
Start: 2021-12-03 | End: 2021-12-07

## 2021-12-03 NOTE — PROGRESS NOTES
Pt transferred to assigned room in [facility], vital signs stable on transfer, All pt belongings sent with pt on transfer

## 2021-12-03 NOTE — PROGRESS NOTES
Report given to OTILIA KENNY [facility]

## 2021-12-03 NOTE — PLAN OF CARE
Problem: ANTEPARTUM/LABOR and DELIVERY  Goal: Maintain pregnancy as long as maternal and/or fetal condition is stable  Description: INTERVENTIONS:  - Maternal surveillance  - Fetal surveillance  - Monitor uterine activity  - Medications as ordered  - Bed assistance  - Assess pain using appropriate pain scale  - Administer analgesics based on type and severity of pain and evaluate response  - Implement non-pharmacological measures as appropriate and evaluate response  - Consider cultural and social influenc Progressing     Problem: SKIN/TISSUE INTEGRITY - ADULT  Goal: Skin integrity remains intact  Description: INTERVENTIONS  - Assess and document risk factors for pressure ulcer development  - Assess and document skin integrity  - Monitor for areas of redness

## 2021-12-03 NOTE — PROGRESS NOTES
Domingo murphy removed 5 pm yesterday, pitocin  Cx: 4-5 cm/80%?-1  FHT\"s 130-140's, possible decelerations, will watch  Difficulty tracing contractions  Epidural in place  IUPC inserted now  CPM

## 2021-12-03 NOTE — L&D DELIVERY NOTE
Tutu Hardin, Girl [[id number]]    Labor Events     labor?: Yes   steroids?: Full Course  Cervical ripening date/time: 2021 1710  Cervical ripening type: Cervical Ripening Balloon  Antibiotics received during labor?: Yes  Antibiotics (enter # Total: 8  9          [place] disposition: NICU     Skin to Skin    No data filed     Vaginal Count    No data filed     Delivery (Maternal)    No data filed           Vaginal Delivery Note          Edenilson Gentle Patient Status:  Inpatient

## 2021-12-03 NOTE — PROGRESS NOTES
Patient admitted via w/c  Safety precautions initiated. Bed in low position. Infant in NICU, contact information provided  Call light is within reach. Oriented to room.   Reviewed plan of care

## 2021-12-03 NOTE — ANESTHESIA PROCEDURE NOTES
Labor Analgesia  Performed by: Nima Shaw MD  Authorized by: Nima Shaw MD       General Information and Staff    Start Time:  12/2/2021 5:42 PM  End Time:  12/2/2021 5:46 PM  Anesthesiologist:  Nima Shaw MD  Performed by:   Anesthesiolog

## 2021-12-03 NOTE — ANESTHESIA PREPROCEDURE EVALUATION
PRE-OP EVALUATION    Patient Name: Varghese Saleh    Admit Diagnosis: Pregnancy  Pregnancy  Poor fetal growth affecting management of mother in third trimester, single or unspecified fetus    Pre-op Diagnosis: * No pre-op diagnosis entered *        Anest STRENGTH) tab 500 mg, 500 mg, Oral, Q6H PRN   Or  acetaminophen (TYLENOL EXTRA STRENGTH) tab 1,000 mg, 1,000 mg, Oral, Q6H PRN  zolpidem (AMBIEN) tab 5 mg, 5 mg, Oral, Nightly PRN  docusate sodium (COLACE) cap 100 mg, 100 mg, Oral, BID  calcium carbonate a 31.8 11/30/2021    MCH 31.5 10/02/2021    MCHC 34.6 11/30/2021    MCHC 33.3 10/02/2021    RDW 12.9 11/30/2021    RDW 12.0 10/02/2021    .0 11/30/2021     10/02/2021     Lab Results   Component Value Date     11/30/2021    K 3.8 11/30/20

## 2021-12-04 NOTE — PROGRESS NOTES
Labor Analgesia Follow Up Note    Patient underwent epidural anesthesia for labor analgesia,    Placenta Date/Time: 12/3/2021 11:19 AM    Delivery Date/Time[de-identified] 12/3/2021  11:14 AM    BP (!) 141/102 (BP Location: Left arm)   Pulse 74   Temp 97.9 °F (36.6 °C)

## 2021-12-04 NOTE — PROGRESS NOTES
[facility]  Post-Partum  Progress Note    Dustin Meléndez Patient Status:  Inpatient    1990 MRN [id number]   [facility] 1SW-J Attending Chante Kunz MD   [address] Day # 4 PCP Deisy Sanchez DO     SUBJECTIVE:    Postpartu Luanne Griffin MD  12/4/2021  2:14 PM

## 2021-12-05 NOTE — PROGRESS NOTES
[facility]  Post-Partum  Progress Note    Sol Lori Patient Status:  Inpatient    1990 MRN [id number]   [facility] 1SW-J Attending Sofya Pope MD   [facility] Day # 5 PCP Jack Ortiz DO     SUBJECTIVE:    Postpartu thus magnesium sulfate has been held. However, if needs IV push we may need to still consider this  Anemia: IV infiltrated before IV iron could be given.  Home with PO iron  Santa Berry MD  12/5/2021  11:31 AM

## 2021-12-05 NOTE — PROGRESS NOTES
Pt denies worsening symptoms states she feels fine sitting up and walking around but has heaviness when laying back. Vital signs within parameters.

## 2021-12-05 NOTE — PROGRESS NOTES
In patient room to assess blood pressure. Patient asked if it was normal for it to a little \"heavy to breathe. \" She is with no other symptoms other than some discomfort when breathing.  Vitals WNL (BP elevated but within parameters) and pt in no visible d

## 2021-12-06 RX ORDER — FUROSEMIDE 20 MG/1
20 TABLET ORAL DAILY
Status: DISCONTINUED | OUTPATIENT
Start: 2021-12-06 | End: 2021-12-07

## 2021-12-06 RX ORDER — LABETALOL 100 MG/1
100 TABLET, FILM COATED ORAL ONCE
Status: COMPLETED | OUTPATIENT
Start: 2021-12-06 | End: 2021-12-06

## 2021-12-06 RX ORDER — LABETALOL 200 MG/1
400 TABLET, FILM COATED ORAL EVERY 8 HOURS SCHEDULED
Status: DISCONTINUED | OUTPATIENT
Start: 2021-12-06 | End: 2021-12-07

## 2021-12-06 RX ORDER — NIFEDIPINE 30 MG/1
30 TABLET, EXTENDED RELEASE ORAL DAILY
Status: DISCONTINUED | OUTPATIENT
Start: 2021-12-06 | End: 2021-12-07

## 2021-12-06 NOTE — CM/SW NOTE
12/06/21 1000   CM/SW Referral Data   Referral Source Patient; Family; Social Work (self-referral)   Reason for Referral Discharge planning;Psychosocial assessment        SW completed an assessment with pt and partner, Kwaku, to provide support and encoura

## 2021-12-06 NOTE — PAYOR COMM NOTE
--------------  CONTINUED STAY REVIEW    Payor: Vern Mcpherson  #:  [id number]  Authorization Number: [id number]    Admit date: 11/30/21  Admit time:  3:29 PM    Admitting Physician: Monse Benítez MD  Attending Physician:  Umu Warren — — AL    12/05/21 0706 — 70 — — 97 % — — — AL    12/05/21 0705 — 72 — — 98 % — — — AL    12/05/21 0704 — 78 — — 96 % — — — AL    12/05/21 0703 — 72 — — 97 % — — — AL    12/05/21 0702 — 71 — — 97 % — — — AL    12/05/21 0701 — 71 — — 96 % — — — AL    12/05/ 12/05/21 0618 — 70 — — 98 % — — — AL    12/05/21 0617 — 69 — — 98 % — — — AL    12/05/21 0616 — 77 — — 99 % — — — AL    12/05/21 0615 — 82 — — 99 % — — — AL    12/05/21 0614 — 81 — — 99 % — — — AL    12/05/21 0613 — 87 — — 99 % — — — [place]    12/05/21 0612 — 12/05/21 0525 — 81 — — 99 % — — — LS    12/05/21 0524 — 86 — — 100 % — — — AL    12/05/21 0523 — 67 — — 98 % — — — AL    12/05/21 0522 — 73 — — 98 % — — — AL    12/05/21 0521 — 71 — — 98 % — — — AL    12/05/21 0520 — 78 — — 99 % — — — LS    12/05/21 0519 — Labor Event Times    Labor onset date/time: 2021 1800       Presentation    Presentation: Vertex      Operative Delivery    No data filed      Shoulder Dystocia    No data filed      Anesthesia    Method: Epidural                           Weight: 3 lb 14 oz    Apgars: 8/9 12/4 OB/GYN NOTE  SUBJECTIVE:     Postpartum Day 1: Vaginal delivery     The patient feels well. Pain is well controlled with current medications. Lochia is tapering. The patient is tolerating a  diet.  Flatus has [97.9 °F (36.6 °C)-98 °F (36.7 °C)] 97.9 °F (36.6 °C)  Pulse:  [] 86  Resp:  [16-20] 16  BP: (120-169)/() 137/85      General:    alert, appears stated age and cooperative   Bowel Sounds:  active         Uterine Fundus:   firm below the umbilic

## 2021-12-06 NOTE — CM/SW NOTE
met with Harjinder Newman (mother) to review insurance and PCP for infant in the NICU. Harjinder Newman stated that infant is going to be added to her spouse's insurance plan. Temi Ngo (spouse) has [employer] plan per Harjinder Newman.   ask that when Temi Ngo re

## 2021-12-07 VITALS
WEIGHT: 189 LBS | HEIGHT: 59 IN | BODY MASS INDEX: 38.1 KG/M2 | HEART RATE: 86 BPM | SYSTOLIC BLOOD PRESSURE: 142 MMHG | OXYGEN SATURATION: 96 % | DIASTOLIC BLOOD PRESSURE: 88 MMHG | RESPIRATION RATE: 16 BRPM | TEMPERATURE: 98 F

## 2021-12-07 RX ORDER — ACETAMINOPHEN 325 MG/1
650 TABLET ORAL EVERY 6 HOURS PRN
Qty: 30 TABLET | Refills: 0 | Status: SHIPPED | OUTPATIENT
Start: 2021-12-07 | End: 2021-12-28

## 2021-12-07 RX ORDER — NIFEDIPINE 30 MG/1
30 TABLET, FILM COATED, EXTENDED RELEASE ORAL DAILY
Qty: 10 TABLET | Refills: 0 | Status: SHIPPED | OUTPATIENT
Start: 2021-12-08

## 2021-12-07 RX ORDER — IBUPROFEN 400 MG/1
600 TABLET ORAL EVERY 6 HOURS
Qty: 60 TABLET | Refills: 0 | Status: SHIPPED | OUTPATIENT
Start: 2021-12-07 | End: 2021-12-28

## 2021-12-07 RX ORDER — LABETALOL 200 MG/1
400 TABLET, FILM COATED ORAL EVERY 8 HOURS SCHEDULED
Qty: 60 TABLET | Refills: 0 | Status: SHIPPED | OUTPATIENT
Start: 2021-12-07

## 2021-12-07 RX ORDER — PSEUDOEPHEDRINE HCL 30 MG
100 TABLET ORAL 2 TIMES DAILY PRN
Qty: 30 CAPSULE | Refills: 0 | Status: SHIPPED | OUTPATIENT
Start: 2021-12-07 | End: 2021-12-28

## 2021-12-07 NOTE — PAYOR COMM NOTE
--------------  CONTINUED STAY REVIEW    Payor: Vern Mcpherson  #:  [id number]  Authorization Number: [id number]    Admit date: 11/30/21  Admit time:  3:29 PM    Admitting Physician: Brittny Keane MD  Attending Physician:  Janette Kuo signs in last 24 hours:  Temp:  [98.2 °F (36.8 °C)] 98.2 °F (36.8 °C)  Pulse:  [70-89] 87  Resp:  [16-18] 17  BP: (138-156)/() 141/86  Vitals     12/06/21  0300 12/06/21  0600 12/06/21  1037 12/06/21  1551   BP: (!) 144/108 (!) 156/99 (labetalol give

## 2021-12-07 NOTE — PROGRESS NOTES
Patient up and about, going home with spouse, but will go see her baby first, DC instructions completed, and they brought all their belongings

## 2021-12-07 NOTE — PROGRESS NOTES
Patient up and about, AOx4, VSS, BP has been WNL, and seen by OB , ok to go home, will go home with BP med and postpartum care instructions reviewed and completed, and patient verbalized understanding, pharmacy will fill up her meds and spouse also very camp

## 2021-12-07 NOTE — PROGRESS NOTES
[facility]  Post-Partum  Progress Note    Flavia Credit Patient Status:  Inpatient    1990 MRN [id number]   [facility] 1SW-J Attending Carolyn Little MD   [facility] Day # 6 PCP Sabrina Torres DO     SUBJECTIVE:    Postpartu

## 2021-12-07 NOTE — DISCHARGE SUMMARY
[facility]  Discharge Summary    Giovanna Roque Patient Status:  Inpatient    1990 MRN [id number]   [facility] 1SW-J Attending Brenda German MD   [facility] Day # 7 PCP Candie Patrick DO     Date of Admission: 2021    Date 0    ibuprofen 400 MG Oral Tab  Take 1.5 tablets (600 mg total) by mouth every 6 (six) hours. Qty: 60 tablet Refills: 0    labetalol 200 MG Oral Tab  Take 2 tablets (400 mg total) by mouth every 8 (eight) hours.   Qty: 60 tablet Refills: 0    NIFEdipine 30

## 2021-12-08 NOTE — PAYOR COMM NOTE
--------------  DISCHARGE REVIEW    Payor: Vern Mcpherson  #:  [id number]  Authorization Number: [id number]    Admit date: 11/30/21  Admit time:   3:29 PM  Discharge Date: 12/7/2021  3:26 PM     Admitting Physician: Liane Thapa MD Labetalol and Procardia. Instructed to check BP at home in morning and evening prior to medications.   Pt asked to call with any signs of complications including but not limited to fever, excessive pain, heavy bleeding, etc.    Follow up: in office in 1 wee

## 2021-12-09 ENCOUNTER — TELEPHONE (OUTPATIENT)
Dept: OBGYN CLINIC | Facility: CLINIC | Age: 31
End: 2021-12-09

## 2021-12-09 NOTE — TELEPHONE ENCOUNTER
Received fax disability papers for  and lmtcb and pay $15 fee for short term disability forms for . Forms in [facility].       Thank you

## 2021-12-09 NOTE — TELEPHONE ENCOUNTER
Forms are actually for patient and will inform fee is 25 dollars for patient and if  would be 15 fee.  [facility]

## 2021-12-09 NOTE — TELEPHONE ENCOUNTER
Letter generated and sent to pt via [employer]. Contacted patient by phone. She has received the letter and will let us know if anything else is needed.

## 2021-12-09 NOTE — TELEPHONE ENCOUNTER
Patient states that she needs a note for her spouse's employer stating that he was with her the whole time in the hospital and will need to take care of her further. Please call her for the details.

## 2021-12-10 ENCOUNTER — TELEPHONE (OUTPATIENT)
Dept: OBGYN UNIT | Facility: HOSPITAL | Age: 31
End: 2021-12-10

## 2021-12-12 ENCOUNTER — TELEPHONE (OUTPATIENT)
Dept: OBGYN UNIT | Facility: HOSPITAL | Age: 31
End: 2021-12-12

## 2021-12-28 ENCOUNTER — POSTPARTUM (OUTPATIENT)
Dept: OBGYN CLINIC | Facility: CLINIC | Age: 31
End: 2021-12-28
Payer: COMMERCIAL

## 2021-12-28 VITALS
SYSTOLIC BLOOD PRESSURE: 122 MMHG | BODY MASS INDEX: 34.11 KG/M2 | DIASTOLIC BLOOD PRESSURE: 78 MMHG | HEIGHT: 59 IN | WEIGHT: 169.19 LBS

## 2021-12-28 PROBLEM — O41.00X0 OLIGOHYDRAMNIOS ANTEPARTUM: Status: RESOLVED | Noted: 2021-12-02 | Resolved: 2021-12-28

## 2021-12-28 PROBLEM — R03.0 ELEVATED BLOOD PRESSURE READING: Status: RESOLVED | Noted: 2021-07-21 | Resolved: 2021-12-28

## 2021-12-28 PROBLEM — Z34.90 PREGNANCY: Status: RESOLVED | Noted: 2021-11-30 | Resolved: 2021-12-28

## 2021-12-28 PROBLEM — O36.5930 POOR FETAL GROWTH AFFECTING MANAGEMENT OF MOTHER IN THIRD TRIMESTER: Status: RESOLVED | Noted: 2021-12-01 | Resolved: 2021-12-28

## 2021-12-28 PROBLEM — Z34.00 SUPERVISION OF NORMAL FIRST PREGNANCY, ANTEPARTUM: Status: RESOLVED | Noted: 2021-06-24 | Resolved: 2021-12-28

## 2021-12-28 PROBLEM — Z34.90 PREGNANCY (HCC): Status: RESOLVED | Noted: 2021-11-30 | Resolved: 2021-12-28

## 2021-12-28 PROBLEM — O41.00X0 OLIGOHYDRAMNIOS ANTEPARTUM (HCC): Status: RESOLVED | Noted: 2021-12-02 | Resolved: 2021-12-28

## 2021-12-28 PROBLEM — Z34.00 SUPERVISION OF NORMAL FIRST PREGNANCY, ANTEPARTUM (HCC): Status: RESOLVED | Noted: 2021-06-24 | Resolved: 2021-12-28

## 2021-12-28 PROBLEM — O36.5930 POOR FETAL GROWTH AFFECTING MANAGEMENT OF MOTHER IN THIRD TRIMESTER (HCC): Status: RESOLVED | Noted: 2021-12-01 | Resolved: 2021-12-28

## 2021-12-28 PROCEDURE — 3008F BODY MASS INDEX DOCD: CPT | Performed by: OBSTETRICS & GYNECOLOGY

## 2021-12-28 PROCEDURE — 3074F SYST BP LT 130 MM HG: CPT | Performed by: OBSTETRICS & GYNECOLOGY

## 2021-12-28 PROCEDURE — 3078F DIAST BP <80 MM HG: CPT | Performed by: OBSTETRICS & GYNECOLOGY

## 2021-12-28 NOTE — PROGRESS NOTES
Post Partum  No C/O  Nursing, trying  Daughter doing well, in NICU 9 days  Minimal bleeding      ROS: No Cardiac, Respiratory, GI,  or Neurological symptoms.     PE:  Abdomen soft  Pelvic:External vag normal, cervix without lesions, uterus anterior, 12 we

## 2021-12-30 NOTE — PAYOR COMM NOTE
--------------  ADMISSION REVIEW     Payor: Vern Barrera #:  [id number]  Authorization Number: [id number]    Admit date: 11/30/21  Admit time:  3:29 PM    Please note that patient admitted on 11/30/21.   Requesting confirmation of autho History    Para Term  AB Living   1 0 0 0 0 0   SAB IAB Ectopic Multiple Live Births   0 0 0 0 0      # Outcome Date GA Lbr Dorian/2nd Weight Sex Delivery Anes PTL Lv   1 Current              Past Medical History:   Past Medical History:   Diagn negative calf tenderness bilaterally, Tamanna's sign negative bilaterally     FHT: moderate variability/140 BPM / Positive accelerations/Negative decelerations   TOCO: acontractile    BSUS: cephalic, MINH 8    Prenatal Labs Brief Review   Blood Type:   Lab Re   OB/GYN: Antepartum Progress Note      SUBJECTIVE:  Patient is a 27year old  female at 34w6d admitted for elevated blood pressure and suspected GHTN vs CHTN.      Doing well.    No HA, RUQ pain or visual changes.      Had 3 min decel earlie

## 2022-01-06 ENCOUNTER — TELEPHONE (OUTPATIENT)
Dept: OBGYN CLINIC | Facility: CLINIC | Age: 32
End: 2022-01-06

## 2022-01-10 ENCOUNTER — TELEPHONE (OUTPATIENT)
Dept: OBGYN CLINIC | Facility: CLINIC | Age: 32
End: 2022-01-10

## 2022-01-10 NOTE — TELEPHONE ENCOUNTER
Received authorization   Customer ID #[id number]  authorization [id number]  Effective dates 12--12-

## 2022-09-16 ENCOUNTER — OFFICE VISIT (OUTPATIENT)
Dept: FAMILY MEDICINE CLINIC | Facility: CLINIC | Age: 32
End: 2022-09-16
Payer: MEDICAID

## 2022-09-16 ENCOUNTER — TELEPHONE (OUTPATIENT)
Dept: OBGYN CLINIC | Facility: CLINIC | Age: 32
End: 2022-09-16

## 2022-09-16 VITALS
RESPIRATION RATE: 20 BRPM | HEART RATE: 83 BPM | SYSTOLIC BLOOD PRESSURE: 130 MMHG | TEMPERATURE: 98 F | WEIGHT: 179 LBS | HEIGHT: 59 IN | OXYGEN SATURATION: 99 % | BODY MASS INDEX: 36.08 KG/M2 | DIASTOLIC BLOOD PRESSURE: 70 MMHG

## 2022-09-16 DIAGNOSIS — Z02.9 ADMINISTRATIVE ENCOUNTER: Primary | ICD-10-CM

## 2022-09-16 PROCEDURE — 3078F DIAST BP <80 MM HG: CPT | Performed by: FAMILY MEDICINE

## 2022-09-16 PROCEDURE — 3075F SYST BP GE 130 - 139MM HG: CPT | Performed by: FAMILY MEDICINE

## 2022-09-16 PROCEDURE — 3008F BODY MASS INDEX DOCD: CPT | Performed by: FAMILY MEDICINE

## 2022-09-16 NOTE — PROGRESS NOTES
Jadyn Buckner is a 34year old female. Patient presents with:  Pregnancy        HPI:     Patient presents with her  who is pleasant and supportive. Thinks last menses was June or July 2022. Home pregnancy test positive approx 3 weeks ago. Thinks she had a period in June was shorter than normal lasting 4 days instead of 5 days. After further discussion, patient thought she was seeing OB/GYN today, I informed patient that I am family medicine and recommend that she make an appointment to see one of the OB/GYN's in [place], she is familiar with that office. Patient was very pleasant and understands there was an error and having her schedule to see me today. Patient did not have a co-pay. No charge for today's interaction.

## 2022-09-16 NOTE — TELEPHONE ENCOUNTER
Patient calling to initiate prenatal care  LMP 6/29/22  Patient is 10 weeks on 9/7/22  NOB appointment scheduled on   Future Appointments   Date Time Provider Josselin Lockwood   9/19/2022 12:30 PM Alexia Blancas MD [facility] [address] [facility]

## 2022-09-19 ENCOUNTER — INITIAL PRENATAL (OUTPATIENT)
Dept: OBGYN CLINIC | Facility: CLINIC | Age: 32
End: 2022-09-19
Payer: MEDICAID

## 2022-09-19 VITALS
HEIGHT: 58.5 IN | DIASTOLIC BLOOD PRESSURE: 78 MMHG | HEART RATE: 91 BPM | BODY MASS INDEX: 37.02 KG/M2 | WEIGHT: 181.19 LBS | SYSTOLIC BLOOD PRESSURE: 132 MMHG

## 2022-09-19 DIAGNOSIS — Z87.59 HISTORY OF PRIOR PREGNANCY WITH IUGR NEWBORN: ICD-10-CM

## 2022-09-19 DIAGNOSIS — Z87.59 HISTORY OF GESTATIONAL HYPERTENSION: ICD-10-CM

## 2022-09-19 DIAGNOSIS — Z34.90 ENCOUNTER FOR SUPERVISION OF NORMAL PREGNANCY, ANTEPARTUM, UNSPECIFIED GRAVIDITY: Primary | ICD-10-CM

## 2022-09-19 DIAGNOSIS — Z87.51 HISTORY OF PRETERM DELIVERY: ICD-10-CM

## 2022-09-19 DIAGNOSIS — Z34.90 PRENATAL CARE, ANTEPARTUM: ICD-10-CM

## 2022-09-19 DIAGNOSIS — D50.8 IRON DEFICIENCY ANEMIA SECONDARY TO INADEQUATE DIETARY IRON INTAKE: ICD-10-CM

## 2022-09-19 LAB
CREAT UR-SCNC: 169 MG/DL
GLUCOSE (URINE DIPSTICK): NEGATIVE MG/DL
MULTISTIX LOT#: NORMAL NUMERIC
PROT UR-MCNC: 13 MG/DL
PROT/CREAT UR-RTO: 0.08

## 2022-09-19 PROCEDURE — 81002 URINALYSIS NONAUTO W/O SCOPE: CPT | Performed by: OBSTETRICS & GYNECOLOGY

## 2022-09-19 PROCEDURE — 3075F SYST BP GE 130 - 139MM HG: CPT | Performed by: OBSTETRICS & GYNECOLOGY

## 2022-09-19 PROCEDURE — 87491 CHLMYD TRACH DNA AMP PROBE: CPT | Performed by: OBSTETRICS & GYNECOLOGY

## 2022-09-19 PROCEDURE — 87591 N.GONORRHOEAE DNA AMP PROB: CPT | Performed by: OBSTETRICS & GYNECOLOGY

## 2022-09-19 PROCEDURE — 3078F DIAST BP <80 MM HG: CPT | Performed by: OBSTETRICS & GYNECOLOGY

## 2022-09-19 PROCEDURE — 0500F INITIAL PRENATAL CARE VISIT: CPT | Performed by: OBSTETRICS & GYNECOLOGY

## 2022-09-19 PROCEDURE — 84156 ASSAY OF PROTEIN URINE: CPT | Performed by: OBSTETRICS & GYNECOLOGY

## 2022-09-19 PROCEDURE — 3008F BODY MASS INDEX DOCD: CPT | Performed by: OBSTETRICS & GYNECOLOGY

## 2022-09-19 PROCEDURE — 87086 URINE CULTURE/COLONY COUNT: CPT | Performed by: OBSTETRICS & GYNECOLOGY

## 2022-09-19 PROCEDURE — 82570 ASSAY OF URINE CREATININE: CPT | Performed by: OBSTETRICS & GYNECOLOGY

## 2022-09-20 PROBLEM — O09.891 SHORT INTERVAL BETWEEN PREGNANCIES AFFECTING PREGNANCY IN FIRST TRIMESTER, ANTEPARTUM: Status: ACTIVE | Noted: 2022-09-20

## 2022-09-20 PROBLEM — Z87.59 HISTORY OF PRIOR PREGNANCY WITH IUGR NEWBORN: Status: ACTIVE | Noted: 2022-09-20

## 2022-09-20 PROBLEM — O09.891 SHORT INTERVAL BETWEEN PREGNANCIES AFFECTING PREGNANCY IN FIRST TRIMESTER, ANTEPARTUM (HCC): Status: ACTIVE | Noted: 2022-09-20

## 2022-09-20 LAB
C TRACH DNA SPEC QL NAA+PROBE: NEGATIVE
N GONORRHOEA DNA SPEC QL NAA+PROBE: NEGATIVE

## 2022-10-06 ENCOUNTER — LAB ENCOUNTER (OUTPATIENT)
Dept: LAB | Age: 32
End: 2022-10-06
Attending: OBSTETRICS & GYNECOLOGY
Payer: MEDICAID

## 2022-10-06 DIAGNOSIS — Z87.59 HISTORY OF GESTATIONAL HYPERTENSION: ICD-10-CM

## 2022-10-06 DIAGNOSIS — Z34.90 ENCOUNTER FOR SUPERVISION OF NORMAL PREGNANCY, ANTEPARTUM, UNSPECIFIED GRAVIDITY: ICD-10-CM

## 2022-10-06 LAB
ALBUMIN SERPL-MCNC: 3.1 G/DL (ref 3.4–5)
ALBUMIN/GLOB SERPL: 0.7 {RATIO} (ref 1–2)
ALP LIVER SERPL-CCNC: 45 U/L
ALT SERPL-CCNC: 14 U/L
ANION GAP SERPL CALC-SCNC: 8 MMOL/L (ref 0–18)
AST SERPL-CCNC: 9 U/L (ref 15–37)
BILIRUB SERPL-MCNC: 0.4 MG/DL (ref 0.1–2)
BUN BLD-MCNC: 7 MG/DL (ref 7–18)
CALCIUM BLD-MCNC: 9 MG/DL (ref 8.5–10.1)
CHLORIDE SERPL-SCNC: 107 MMOL/L (ref 98–112)
CO2 SERPL-SCNC: 19 MMOL/L (ref 21–32)
CREAT BLD-MCNC: 0.65 MG/DL
FASTING STATUS PATIENT QL REPORTED: YES
GFR SERPLBLD BASED ON 1.73 SQ M-ARVRAT: 119 ML/MIN/1.73M2 (ref 60–?)
GLOBULIN PLAS-MCNC: 4.4 G/DL (ref 2.8–4.4)
GLUCOSE BLD-MCNC: 84 MG/DL (ref 70–99)
HBV SURFACE AG SER-ACNC: <0.1 [IU]/L
HBV SURFACE AG SERPL QL IA: NONREACTIVE
HCV AB SERPL QL IA: NONREACTIVE
OSMOLALITY SERPL CALC.SUM OF ELEC: 275 MOSM/KG (ref 275–295)
POTASSIUM SERPL-SCNC: 3.6 MMOL/L (ref 3.5–5.1)
PROT SERPL-MCNC: 7.5 G/DL (ref 6.4–8.2)
RUBV IGG SER QL: POSITIVE
RUBV IGG SER-ACNC: 77.5 IU/ML (ref 10–?)
SODIUM SERPL-SCNC: 134 MMOL/L (ref 136–145)
T PALLIDUM AB SER QL IA: NONREACTIVE
URATE SERPL-MCNC: 3.4 MG/DL

## 2022-10-06 PROCEDURE — 36415 COLL VENOUS BLD VENIPUNCTURE: CPT

## 2022-10-06 PROCEDURE — 87389 HIV-1 AG W/HIV-1&-2 AB AG IA: CPT

## 2022-10-06 PROCEDURE — 86803 HEPATITIS C AB TEST: CPT

## 2022-10-06 PROCEDURE — 84550 ASSAY OF BLOOD/URIC ACID: CPT

## 2022-10-06 PROCEDURE — 86780 TREPONEMA PALLIDUM: CPT

## 2022-10-06 PROCEDURE — 86762 RUBELLA ANTIBODY: CPT

## 2022-10-06 PROCEDURE — 87340 HEPATITIS B SURFACE AG IA: CPT

## 2022-10-06 PROCEDURE — 80053 COMPREHEN METABOLIC PANEL: CPT

## 2022-10-11 ENCOUNTER — TELEPHONE (OUTPATIENT)
Dept: OBGYN CLINIC | Facility: CLINIC | Age: 32
End: 2022-10-11

## 2022-10-11 NOTE — TELEPHONE ENCOUNTER
Patient notified by telephone. MaterniT 21 results show expected representation of chromosomes 21, 18, and 13. Patient also notified Female sex.

## 2022-10-11 NOTE — TELEPHONE ENCOUNTER
Received [id number] results via fax, please review.     Placed in Dr Cassius kulkarni in [address]

## 2022-10-12 ENCOUNTER — MED REC SCAN ONLY (OUTPATIENT)
Dept: OBGYN CLINIC | Facility: CLINIC | Age: 32
End: 2022-10-12

## 2022-10-19 ENCOUNTER — ROUTINE PRENATAL (OUTPATIENT)
Dept: OBGYN CLINIC | Facility: CLINIC | Age: 32
End: 2022-10-19
Payer: MEDICAID

## 2022-10-19 ENCOUNTER — TELEPHONE (OUTPATIENT)
Dept: OBGYN CLINIC | Facility: CLINIC | Age: 32
End: 2022-10-19

## 2022-10-19 VITALS
HEART RATE: 113 BPM | SYSTOLIC BLOOD PRESSURE: 130 MMHG | BODY MASS INDEX: 37.8 KG/M2 | WEIGHT: 185 LBS | HEIGHT: 58.5 IN | DIASTOLIC BLOOD PRESSURE: 90 MMHG

## 2022-10-19 DIAGNOSIS — Z87.59 HISTORY OF GESTATIONAL HYPERTENSION: ICD-10-CM

## 2022-10-19 DIAGNOSIS — Z34.90 PRENATAL CARE, ANTEPARTUM: Primary | ICD-10-CM

## 2022-10-19 LAB
GLUCOSE (URINE DIPSTICK): NEGATIVE MG/DL
MULTISTIX LOT#: NORMAL NUMERIC

## 2022-10-19 PROCEDURE — 81002 URINALYSIS NONAUTO W/O SCOPE: CPT

## 2022-10-19 PROCEDURE — 3075F SYST BP GE 130 - 139MM HG: CPT

## 2022-10-19 PROCEDURE — 3008F BODY MASS INDEX DOCD: CPT

## 2022-10-19 PROCEDURE — 0502F SUBSEQUENT PRENATAL CARE: CPT

## 2022-10-19 PROCEDURE — 3080F DIAST BP >= 90 MM HG: CPT

## 2022-10-19 RX ORDER — ASPIRIN 81 MG/1
81 TABLET ORAL DAILY
COMMUNITY

## 2022-10-19 NOTE — TELEPHONE ENCOUNTER
Sent message for pt to come in next week for BP check and have [address] [facility]

## 2022-10-19 NOTE — PROGRESS NOTES
AMI    GA: 16w0d   10/19/22  1013   BP: 130/90   Pulse: 113   Weight: 185 lb 0.2 oz (83.9 kg)   Height: 58.5\"       Doing well  No complaints. Denies headache, blurred vision, or epigastric pain. Bilateral reflexes +2 no clonus   RH positive  Genetic testing MT21  Anatomy Scan has appointment with MFM scheduled   Baseline PreE labs-order placed  Instructed pt to check BP at home and call the office if BP >515 systolic and/or >692 diastolic or has PIH symptoms. Problem List Items Addressed This Visit     None      Visit Diagnoses     Prenatal care, antepartum    -  Primary    Relevant Orders    URINALYSIS NONAUTO W/O SCOPE (OB URISTIX) (Completed)        RTC 1 wk for BP check    Note to patient and family   The [facility] makes medical notes available to patients in the interest of transparency. However, please be advised that this is a medical document. It is intended as gtqc-xa-vggz communication. It is written and medical language may contain abbreviations or verbiage that are technical and unfamiliar. It may appear blunt or direct. Medical documents are intended to carry relevant information, facts as evident, and the clinical opinion of the practitioner.

## 2022-10-26 ENCOUNTER — TELEPHONE (OUTPATIENT)
Dept: OBGYN CLINIC | Facility: CLINIC | Age: 32
End: 2022-10-26

## 2022-10-26 NOTE — TELEPHONE ENCOUNTER
Patient is overdue for prenatal screening and was to complete [address] labs as well as have appt for BP check in office this week. Call to patient; no answer. Left message to call back.

## 2022-10-26 NOTE — TELEPHONE ENCOUNTER
Patient returned call. Assisted with scheduling BP check in office. Reminder given for lab work. Specifically asked that patient make sure they include the prenatal screen in the labs. Patient states understanding.

## 2022-10-27 ENCOUNTER — ROUTINE PRENATAL (OUTPATIENT)
Dept: OBGYN CLINIC | Facility: CLINIC | Age: 32
End: 2022-10-27
Payer: MEDICAID

## 2022-10-27 ENCOUNTER — LABORATORY ENCOUNTER (OUTPATIENT)
Dept: LAB | Age: 32
End: 2022-10-27
Attending: OBSTETRICS & GYNECOLOGY
Payer: MEDICAID

## 2022-10-27 VITALS — SYSTOLIC BLOOD PRESSURE: 128 MMHG | BODY MASS INDEX: 37 KG/M2 | WEIGHT: 182.13 LBS | DIASTOLIC BLOOD PRESSURE: 76 MMHG

## 2022-10-27 DIAGNOSIS — Z87.59 HISTORY OF GESTATIONAL HYPERTENSION: ICD-10-CM

## 2022-10-27 DIAGNOSIS — Z34.90 ENCOUNTER FOR SUPERVISION OF NORMAL PREGNANCY, ANTEPARTUM, UNSPECIFIED GRAVIDITY: ICD-10-CM

## 2022-10-27 DIAGNOSIS — Z3A.17 17 WEEKS GESTATION OF PREGNANCY: Primary | ICD-10-CM

## 2022-10-27 DIAGNOSIS — N89.8 VAGINAL LEUKORRHEA: ICD-10-CM

## 2022-10-27 LAB
ALBUMIN SERPL-MCNC: 3.1 G/DL (ref 3.4–5)
ALBUMIN/GLOB SERPL: 0.8 {RATIO} (ref 1–2)
ALP LIVER SERPL-CCNC: 49 U/L
ALT SERPL-CCNC: 20 U/L
ANION GAP SERPL CALC-SCNC: 7 MMOL/L (ref 0–18)
ANTIBODY SCREEN: NEGATIVE
AST SERPL-CCNC: 14 U/L (ref 15–37)
BASOPHILS # BLD AUTO: 0.02 X10(3) UL (ref 0–0.2)
BASOPHILS NFR BLD AUTO: 0.3 %
BILIRUB SERPL-MCNC: 0.2 MG/DL (ref 0.1–2)
BUN BLD-MCNC: 12 MG/DL (ref 7–18)
CALCIUM BLD-MCNC: 9.3 MG/DL (ref 8.5–10.1)
CHLORIDE SERPL-SCNC: 108 MMOL/L (ref 98–112)
CO2 SERPL-SCNC: 20 MMOL/L (ref 21–32)
CREAT BLD-MCNC: 0.76 MG/DL
CREAT UR-SCNC: 304 MG/DL
EOSINOPHIL # BLD AUTO: 0.2 X10(3) UL (ref 0–0.7)
EOSINOPHIL NFR BLD AUTO: 2.6 %
ERYTHROCYTE [DISTWIDTH] IN BLOOD BY AUTOMATED COUNT: 13.8 %
FASTING STATUS PATIENT QL REPORTED: YES
GFR SERPLBLD BASED ON 1.73 SQ M-ARVRAT: 106 ML/MIN/1.73M2 (ref 60–?)
GLOBULIN PLAS-MCNC: 4.1 G/DL (ref 2.8–4.4)
GLUCOSE (URINE DIPSTICK): NEGATIVE MG/DL
GLUCOSE BLD-MCNC: 86 MG/DL (ref 70–99)
HCT VFR BLD AUTO: 34.6 %
HGB BLD-MCNC: 11.9 G/DL
IMM GRANULOCYTES # BLD AUTO: 0.03 X10(3) UL (ref 0–1)
IMM GRANULOCYTES NFR BLD: 0.4 %
LYMPHOCYTES # BLD AUTO: 1.81 X10(3) UL (ref 1–4)
LYMPHOCYTES NFR BLD AUTO: 23.4 %
MCH RBC QN AUTO: 31 PG (ref 26–34)
MCHC RBC AUTO-ENTMCNC: 34.4 G/DL (ref 31–37)
MCV RBC AUTO: 90.1 FL
MONOCYTES # BLD AUTO: 0.58 X10(3) UL (ref 0.1–1)
MONOCYTES NFR BLD AUTO: 7.5 %
MULTISTIX LOT#: NORMAL NUMERIC
NEUTROPHILS # BLD AUTO: 5.09 X10 (3) UL (ref 1.5–7.7)
NEUTROPHILS # BLD AUTO: 5.09 X10(3) UL (ref 1.5–7.7)
NEUTROPHILS NFR BLD AUTO: 65.8 %
OSMOLALITY SERPL CALC.SUM OF ELEC: 279 MOSM/KG (ref 275–295)
PLATELET # BLD AUTO: 292 10(3)UL (ref 150–450)
POTASSIUM SERPL-SCNC: 3.7 MMOL/L (ref 3.5–5.1)
PROT SERPL-MCNC: 7.2 G/DL (ref 6.4–8.2)
PROT UR-MCNC: 7.9 MG/DL
PROT/CREAT UR-RTO: 0.03
RBC # BLD AUTO: 3.84 X10(6)UL
RH BLOOD TYPE: POSITIVE
SODIUM SERPL-SCNC: 135 MMOL/L (ref 136–145)
URATE SERPL-MCNC: 3.9 MG/DL
WBC # BLD AUTO: 7.7 X10(3) UL (ref 4–11)

## 2022-10-27 PROCEDURE — 87480 CANDIDA DNA DIR PROBE: CPT | Performed by: NURSE PRACTITIONER

## 2022-10-27 PROCEDURE — 80053 COMPREHEN METABOLIC PANEL: CPT

## 2022-10-27 PROCEDURE — 87510 GARDNER VAG DNA DIR PROBE: CPT | Performed by: NURSE PRACTITIONER

## 2022-10-27 PROCEDURE — 86901 BLOOD TYPING SEROLOGIC RH(D): CPT

## 2022-10-27 PROCEDURE — 86900 BLOOD TYPING SEROLOGIC ABO: CPT

## 2022-10-27 PROCEDURE — 36415 COLL VENOUS BLD VENIPUNCTURE: CPT

## 2022-10-27 PROCEDURE — 87660 TRICHOMONAS VAGIN DIR PROBE: CPT | Performed by: NURSE PRACTITIONER

## 2022-10-27 PROCEDURE — 3074F SYST BP LT 130 MM HG: CPT | Performed by: NURSE PRACTITIONER

## 2022-10-27 PROCEDURE — 3078F DIAST BP <80 MM HG: CPT | Performed by: NURSE PRACTITIONER

## 2022-10-27 PROCEDURE — 86850 RBC ANTIBODY SCREEN: CPT

## 2022-10-27 PROCEDURE — 84156 ASSAY OF PROTEIN URINE: CPT

## 2022-10-27 PROCEDURE — 0502F SUBSEQUENT PRENATAL CARE: CPT | Performed by: NURSE PRACTITIONER

## 2022-10-27 PROCEDURE — 85025 COMPLETE CBC W/AUTO DIFF WBC: CPT

## 2022-10-27 PROCEDURE — 84550 ASSAY OF BLOOD/URIC ACID: CPT

## 2022-10-27 PROCEDURE — 81002 URINALYSIS NONAUTO W/O SCOPE: CPT | Performed by: NURSE PRACTITIONER

## 2022-10-27 PROCEDURE — 82570 ASSAY OF URINE CREATININE: CPT

## 2022-10-27 NOTE — PROGRESS NOTES
AMI  Doing well, denies headaches, visual changes, epigastric pain  Does note a change in vaginal odor, no other symptoms  No complaints. No LOF/VB/uctx  BP normal at home, she will continue to monitor.  Check labs  Keep follow up for next routine OB visit

## 2022-11-15 ENCOUNTER — TELEPHONE (OUTPATIENT)
Dept: PERINATAL CARE | Facility: HOSPITAL | Age: 32
End: 2022-11-15

## 2022-11-21 ENCOUNTER — ROUTINE PRENATAL (OUTPATIENT)
Dept: OBGYN CLINIC | Facility: CLINIC | Age: 32
End: 2022-11-21
Payer: MEDICAID

## 2022-11-21 VITALS
HEIGHT: 58.5 IN | DIASTOLIC BLOOD PRESSURE: 86 MMHG | SYSTOLIC BLOOD PRESSURE: 132 MMHG | WEIGHT: 186.38 LBS | BODY MASS INDEX: 38.08 KG/M2 | HEART RATE: 103 BPM

## 2022-11-21 DIAGNOSIS — Z36.9 ANTENATAL SCREENING ENCOUNTER: ICD-10-CM

## 2022-11-21 DIAGNOSIS — Z3A.20 20 WEEKS GESTATION OF PREGNANCY: Primary | ICD-10-CM

## 2022-11-21 DIAGNOSIS — Z87.59 HISTORY OF GESTATIONAL HYPERTENSION: ICD-10-CM

## 2022-11-21 LAB
GLUCOSE (URINE DIPSTICK): NEGATIVE MG/DL
MULTISTIX LOT#: NORMAL NUMERIC

## 2022-11-21 PROCEDURE — 0502F SUBSEQUENT PRENATAL CARE: CPT | Performed by: OBSTETRICS & GYNECOLOGY

## 2022-11-21 PROCEDURE — 3079F DIAST BP 80-89 MM HG: CPT | Performed by: OBSTETRICS & GYNECOLOGY

## 2022-11-21 PROCEDURE — 81002 URINALYSIS NONAUTO W/O SCOPE: CPT | Performed by: OBSTETRICS & GYNECOLOGY

## 2022-11-21 PROCEDURE — 3008F BODY MASS INDEX DOCD: CPT | Performed by: OBSTETRICS & GYNECOLOGY

## 2022-11-21 PROCEDURE — 3075F SYST BP GE 130 - 139MM HG: CPT | Performed by: OBSTETRICS & GYNECOLOGY

## 2022-11-21 NOTE — PROGRESS NOTES
Patient presents with:  Prenatal Care: AMI    Routine prenatal visit. Patient without complaints. Good fetal movement. Patient denies any bleeding, leaking fluid, cramping, or contractions. Assessment/Plan:  20w5d doing well  Hx of gestational hypertension- baseline labs done, ASA. Patient has not been checking home BP's regularly. [employer] [place] sent. 1 hr GTT and CBC next. Blood type O pos. Screening for fetal malformations- appt for Level 2 22  Patient had MT 21. Declines carrier    Diagnoses and all orders for this visit:    20 weeks gestation of pregnancy  -     URINALYSIS NONAUTO W/O SCOPE (OB URISTIX)     screening encounter  -     CBC (WITH DIFF, PLATELET) REFLEX TO FERRITIN; Future  -     GLUCOSE 1HR OB; Future    History of gestational hypertension  -     MYCHART AMB PATIENT ENTERED BLOOD PRESSURE      Return in about 4 weeks (around 2022) for Routine Prenatal Visit, Glucola and labs.

## 2022-12-13 ENCOUNTER — ULTRASOUND ENCOUNTER (OUTPATIENT)
Dept: PERINATAL CARE | Facility: HOSPITAL | Age: 32
End: 2022-12-13
Attending: OBSTETRICS & GYNECOLOGY
Payer: MEDICAID

## 2022-12-13 VITALS
HEART RATE: 90 BPM | BODY MASS INDEX: 38.21 KG/M2 | DIASTOLIC BLOOD PRESSURE: 88 MMHG | WEIGHT: 187 LBS | HEIGHT: 58.5 IN | SYSTOLIC BLOOD PRESSURE: 136 MMHG

## 2022-12-13 DIAGNOSIS — O13.9 GESTATIONAL HYPERTENSION, ANTEPARTUM: Primary | ICD-10-CM

## 2022-12-13 DIAGNOSIS — O36.5930 POOR FETAL GROWTH AFFECTING MANAGEMENT OF MOTHER IN THIRD TRIMESTER, SINGLE OR UNSPECIFIED FETUS: ICD-10-CM

## 2022-12-13 DIAGNOSIS — Z87.51 HISTORY OF PRETERM DELIVERY: ICD-10-CM

## 2022-12-13 DIAGNOSIS — Z87.59 HISTORY OF GESTATIONAL HYPERTENSION: ICD-10-CM

## 2022-12-13 DIAGNOSIS — O99.212 OBESITY AFFECTING PREGNANCY IN SECOND TRIMESTER: ICD-10-CM

## 2022-12-13 DIAGNOSIS — Z03.75 SUSPECTED SHORTENING OF CERVIX NOT FOUND: ICD-10-CM

## 2022-12-13 DIAGNOSIS — Z87.59 HISTORY OF PRIOR PREGNANCY WITH IUGR NEWBORN: ICD-10-CM

## 2022-12-13 DIAGNOSIS — O13.9 GESTATIONAL HYPERTENSION, ANTEPARTUM: ICD-10-CM

## 2022-12-13 PROBLEM — E66.9 OBESITY, CLASS II, BMI 35-39.9, ISOLATED: Status: ACTIVE | Noted: 2022-12-13

## 2022-12-13 PROBLEM — E66.812 OBESITY, CLASS II, BMI 35-39.9, ISOLATED: Status: ACTIVE | Noted: 2022-12-13

## 2022-12-13 PROCEDURE — 76811 OB US DETAILED SNGL FETUS: CPT | Performed by: OBSTETRICS & GYNECOLOGY

## 2022-12-13 PROCEDURE — 76817 TRANSVAGINAL US OBSTETRIC: CPT

## 2022-12-15 ENCOUNTER — ROUTINE PRENATAL (OUTPATIENT)
Dept: OBGYN CLINIC | Facility: CLINIC | Age: 32
End: 2022-12-15
Payer: MEDICAID

## 2022-12-15 VITALS
HEIGHT: 58.5 IN | WEIGHT: 189 LBS | HEART RATE: 91 BPM | DIASTOLIC BLOOD PRESSURE: 80 MMHG | SYSTOLIC BLOOD PRESSURE: 118 MMHG | BODY MASS INDEX: 38.62 KG/M2

## 2022-12-15 DIAGNOSIS — Z87.59 HISTORY OF GESTATIONAL HYPERTENSION: ICD-10-CM

## 2022-12-15 DIAGNOSIS — Z3A.24 24 WEEKS GESTATION OF PREGNANCY: Primary | ICD-10-CM

## 2022-12-15 LAB
GLUCOSE (URINE DIPSTICK): NEGATIVE MG/DL
MULTISTIX LOT#: NORMAL NUMERIC

## 2022-12-15 PROCEDURE — 3008F BODY MASS INDEX DOCD: CPT | Performed by: NURSE PRACTITIONER

## 2022-12-15 PROCEDURE — 81002 URINALYSIS NONAUTO W/O SCOPE: CPT | Performed by: NURSE PRACTITIONER

## 2022-12-15 PROCEDURE — 0502F SUBSEQUENT PRENATAL CARE: CPT | Performed by: NURSE PRACTITIONER

## 2022-12-15 PROCEDURE — 3074F SYST BP LT 130 MM HG: CPT | Performed by: NURSE PRACTITIONER

## 2022-12-15 PROCEDURE — 3079F DIAST BP 80-89 MM HG: CPT | Performed by: NURSE PRACTITIONER

## 2022-12-15 NOTE — PROGRESS NOTES
Patient presents with:  Prenatal Care: AMI    Routine prenatal visit. Patient without complaints. Good fetal movement  Patient denies any bleeding, leaking fluid, cramping, or contractions. Assessment/Plan:  24w1d doing well  1 hour GTT, CBC ordered. Pt reminded to complete  Blood type O Positive  Hx of gestations hypertension: continue ASA, monthly growth US with MFM, weekly NST at 36 weeks  Reviewed vaccine recommendations: Tdap discussed, will consider for next visit. Reviewed  labor signs and symptoms. Diagnoses and all orders for this visit:    24 weeks gestation of pregnancy  -     URINALYSIS NONAUTO W/O SCOPE (OB URISTIX)    History of gestational hypertension        Return in about 4 weeks (around 2023) for AMI.

## 2023-01-05 ENCOUNTER — PATIENT MESSAGE (OUTPATIENT)
Dept: OBGYN CLINIC | Facility: CLINIC | Age: 33
End: 2023-01-05

## 2023-01-06 ENCOUNTER — TELEPHONE (OUTPATIENT)
Dept: OBGYN CLINIC | Facility: CLINIC | Age: 33
End: 2023-01-06

## 2023-01-06 NOTE — TELEPHONE ENCOUNTER
2022      Demi Aldana (:  1980) is a 36 Left detailed message for patient not to fly or sign our AMA. Explained we want her to be safe coming home because her preeclampsia is concerning at this early stage of pregnancy. Asked patient to call back if she has further questions or needs anything else. Jody Anaya MD, Dermatology, [place]  [address] [place] [address]  Jacobo Galaviz MD [facility]   Sheridan Mortensen MD   nystatin (MYCOSTATIN) [id number] 03/36/8811    Complicated migraine 93/6601    98/38/1112    Folliculitis 13/90/9033    Fx ankle 2003    right    SHANDA ([address] 04/09/2013    Estrella Curran [address]     or autoimmune disorder    ADAMSON ([facility] 1994     Last attempt to quit: 1/1/2012     Years since quitting: 10.9/8/2022 2/1/2022 4/26/2021 11/12/2020 2/5/2020 8/22/2019 06/05/2020    Diabetic foot exam  08/22/2020    Breast cancer screen  02/05/2022    Diabetic retinal exam  06/25/2022    Flu vaccine (1) 09/01/2022    Lipids  10/01/2022    Diabetic microalbuminuria test  07/26/2023    A1C test (Diabetic or Prediabetic)  09/08/2023    Depression Monitoring  09/08/2023    Cervical cancer screen  09/08/[place] Cam is well-developed. Dario Cam is obese. Dario Cam 1994        Last attempt to quit: 1/1/2012        Years since quitting: 10.5/2022    [address] [place] [address]     Referral Priority:   Routine     Referral Type:   Eval and Treat     Referral Reason:   Specialty Services Required     Referred to Provider:   Aretha Badillo     Requested Specialty:   Genetic Counselor     Number of Visits Requested:   Shaheed Manley MD, Dermatology, [facility]     

## 2023-01-06 NOTE — TELEPHONE ENCOUNTER
Pt calling states she is out of town in [place], [place] and was admitted to the hospital out there. She would appreciate a call to discuss some decisions the hosptial is making that concerns her. She was told she has preeclampsia and would possibly deliver in the next week. Bps were high but has since come down to 130/82- on meds. Pt would like to come home to deliver with us.

## 2023-01-06 NOTE — TELEPHONE ENCOUNTER
Hx: [id number]  GA: 27w2d (4/5/2023)    Hx of gestations hypertension: continue ASA, monthly growth US with MFM, weekly NST at 36 weeks    Patient calling because she is currently in the hospital in [place], [place]. Patient was admitted due to pre-eclampsia. Patient states she went to [facility] and then got sent to the hospital due to her swelling and high BP readings. Patient is currently hospitalized and on IV Magnesium and Labetalol. Patient reports high protein in the urine, and high creatinine levels as well. Patient wants to come home and be treated here. Discussed the importance of staying in the hospital until she is off the IV medications, her BP are controlled with oral meds and until her labs/urine are better controlled. After a long discussion patient states understanding and willingness to comply with hospital recommendations. No further questions.

## 2023-05-04 ENCOUNTER — TELEPHONE (OUTPATIENT)
Dept: OBGYN CLINIC | Facility: CLINIC | Age: 33
End: 2023-05-04

## 2023-05-04 NOTE — TELEPHONE ENCOUNTER
Patient ended up having baby in [place] in January was due in April, she would like to speak to nurse regarding symptoms she has been having and would like to be seen sooner than later.     Thank you

## 2023-05-04 NOTE — TELEPHONE ENCOUNTER
Patient delivered in [place] at [facility] (01/08/2023)  Her and baby returned to [place] (home) March 15th. Patient diagnosed with severe pre-eclampsia  She didn't have her post-partum visit because she loss trust in the providers after she had an allergic reaction to one of the BP meds. She wanted to come in to just have a check up after having the baby. She hasn't checked her BP in over a month. Feels fine  She knows that she can come in for an appt to just make sure her blood pressure is fine and she healed okay. Patient verbalizes understanding. Appointment scheduled for follow-up after delivery. Patient verbalizes understanding  Pregnancy episode closed.

## 2023-06-07 ENCOUNTER — OFFICE VISIT (OUTPATIENT)
Dept: OBGYN CLINIC | Facility: CLINIC | Age: 33
End: 2023-06-07
Payer: MEDICAID

## 2023-06-07 VITALS
HEIGHT: 59 IN | BODY MASS INDEX: 37.46 KG/M2 | WEIGHT: 185.81 LBS | SYSTOLIC BLOOD PRESSURE: 138 MMHG | HEART RATE: 89 BPM | DIASTOLIC BLOOD PRESSURE: 98 MMHG

## 2023-06-07 DIAGNOSIS — O16.1 ELEVATED BLOOD PRESSURE AFFECTING PREGNANCY IN FIRST TRIMESTER, ANTEPARTUM: Primary | ICD-10-CM

## 2023-06-07 DIAGNOSIS — Z12.4 SCREENING FOR CERVICAL CANCER: ICD-10-CM

## 2023-06-07 PROBLEM — O10.919 CHRONIC HYPERTENSION DURING PREGNANCY (HCC): Status: ACTIVE | Noted: 2023-01-06

## 2023-06-07 PROBLEM — O10.919 CHRONIC HYPERTENSION DURING PREGNANCY: Status: ACTIVE | Noted: 2023-01-06

## 2023-06-07 PROBLEM — O14.12 SEVERE PRE-ECLAMPSIA IN SECOND TRIMESTER: Status: ACTIVE | Noted: 2023-01-06

## 2023-06-07 PROBLEM — O14.12: Status: ACTIVE | Noted: 2023-01-06

## 2023-06-07 PROCEDURE — 99213 OFFICE O/P EST LOW 20 MIN: CPT | Performed by: OBSTETRICS & GYNECOLOGY

## 2023-06-07 PROCEDURE — 87624 HPV HI-RISK TYP POOLED RSLT: CPT | Performed by: OBSTETRICS & GYNECOLOGY

## 2023-06-07 PROCEDURE — 3075F SYST BP GE 130 - 139MM HG: CPT | Performed by: OBSTETRICS & GYNECOLOGY

## 2023-06-07 PROCEDURE — 3008F BODY MASS INDEX DOCD: CPT | Performed by: OBSTETRICS & GYNECOLOGY

## 2023-06-07 PROCEDURE — 3080F DIAST BP >= 90 MM HG: CPT | Performed by: OBSTETRICS & GYNECOLOGY

## 2023-06-07 RX ORDER — LABETALOL 100 MG/1
500 TABLET, FILM COATED ORAL
COMMUNITY
Start: 2023-01-19

## 2023-06-07 RX ORDER — METHYLPREDNISOLONE 4 MG/1
TABLET ORAL
COMMUNITY
Start: 2023-01-21

## 2023-06-07 RX ORDER — HYDRALAZINE HYDROCHLORIDE 25 MG/1
25 TABLET, FILM COATED ORAL
COMMUNITY
Start: 2023-03-16

## 2023-06-08 LAB — HPV I/H RISK 1 DNA SPEC QL NAA+PROBE: NEGATIVE

## 2023-06-12 ENCOUNTER — TELEPHONE (OUTPATIENT)
Dept: FAMILY MEDICINE CLINIC | Facility: CLINIC | Age: 33
End: 2023-06-12

## 2023-06-12 NOTE — TELEPHONE ENCOUNTER
DAVID patient scheduled with you 6/19/23. Spoke with patient reports delivered her baby in Jan 2023 since then has had elevated blood pressure readings. Patient reports does not check blood pressure every day, but when she has it has been running 138-140's/98. Reports is not currently taking anything for her blood pressure. Patient denies any s/s of elevated blood pressure at this time. Patient has blood pressure machine at home and was advised to check daily and continue to log prior to appt 6/19/23.

## 2023-06-12 NOTE — TELEPHONE ENCOUNTER
Patient scheduled apt for 6/19 thru my chart. Patient states she has an elevated blood pressure, she is 5 month post partum.  Please advise

## 2023-06-19 ENCOUNTER — OFFICE VISIT (OUTPATIENT)
Dept: FAMILY MEDICINE CLINIC | Facility: CLINIC | Age: 33
End: 2023-06-19
Payer: MEDICAID

## 2023-06-19 VITALS
SYSTOLIC BLOOD PRESSURE: 144 MMHG | HEART RATE: 96 BPM | HEIGHT: 59 IN | RESPIRATION RATE: 16 BRPM | BODY MASS INDEX: 36.49 KG/M2 | OXYGEN SATURATION: 98 % | DIASTOLIC BLOOD PRESSURE: 100 MMHG | WEIGHT: 181 LBS

## 2023-06-19 DIAGNOSIS — Z13.0 SCREENING FOR DEFICIENCY ANEMIA: ICD-10-CM

## 2023-06-19 DIAGNOSIS — I10 PRIMARY HYPERTENSION: Primary | ICD-10-CM

## 2023-06-19 PROCEDURE — 3080F DIAST BP >= 90 MM HG: CPT | Performed by: FAMILY MEDICINE

## 2023-06-19 PROCEDURE — 3008F BODY MASS INDEX DOCD: CPT | Performed by: FAMILY MEDICINE

## 2023-06-19 PROCEDURE — 3077F SYST BP >= 140 MM HG: CPT | Performed by: FAMILY MEDICINE

## 2023-06-19 PROCEDURE — 99214 OFFICE O/P EST MOD 30 MIN: CPT | Performed by: FAMILY MEDICINE

## 2023-06-19 RX ORDER — LABETALOL 100 MG/1
100 TABLET, FILM COATED ORAL 2 TIMES DAILY
Qty: 60 TABLET | Refills: 1 | Status: SHIPPED | OUTPATIENT
Start: 2023-06-19 | End: 2023-06-19

## 2023-06-20 PROBLEM — O09.891 SHORT INTERVAL BETWEEN PREGNANCIES AFFECTING PREGNANCY IN FIRST TRIMESTER, ANTEPARTUM (HCC): Status: RESOLVED | Noted: 2022-09-20 | Resolved: 2023-06-20

## 2023-06-20 PROBLEM — O10.919 CHRONIC HYPERTENSION DURING PREGNANCY: Status: RESOLVED | Noted: 2023-01-06 | Resolved: 2023-06-20

## 2023-06-20 PROBLEM — O09.891 SHORT INTERVAL BETWEEN PREGNANCIES AFFECTING PREGNANCY IN FIRST TRIMESTER, ANTEPARTUM: Status: RESOLVED | Noted: 2022-09-20 | Resolved: 2023-06-20

## 2023-06-20 PROBLEM — O10.919 CHRONIC HYPERTENSION DURING PREGNANCY (HCC): Status: RESOLVED | Noted: 2023-01-06 | Resolved: 2023-06-20

## 2023-06-20 RX ORDER — LABETALOL 100 MG/1
100 TABLET, FILM COATED ORAL 2 TIMES DAILY
Qty: 60 TABLET | Refills: 1 | Status: SHIPPED | OUTPATIENT
Start: 2023-06-20

## 2023-06-22 LAB
ABSOLUTE BASOPHILS: 22 CELLS/UL (ref 0–200)
ABSOLUTE EOSINOPHILS: 108 CELLS/UL (ref 15–500)
ABSOLUTE LYMPHOCYTES: 2273 CELLS/UL (ref 850–3900)
ABSOLUTE MONOCYTES: 470 CELLS/UL (ref 200–950)
ABSOLUTE NEUTROPHILS: 2527 CELLS/UL (ref 1500–7800)
ALBUMIN/GLOBULIN RATIO: 1.6 (CALC) (ref 1–2.5)
ALBUMIN: 4.3 G/DL (ref 3.6–5.1)
ALKALINE PHOSPHATASE: 55 U/L (ref 31–125)
ALT: 19 U/L (ref 6–29)
AST: 19 U/L (ref 10–30)
BASOPHILS: 0.4 %
BILIRUBIN, TOTAL: 0.8 MG/DL (ref 0.2–1.2)
BUN: 11 MG/DL (ref 7–25)
CALCIUM: 9.4 MG/DL (ref 8.6–10.2)
CARBON DIOXIDE: 27 MMOL/L (ref 20–32)
CHLORIDE: 104 MMOL/L (ref 98–110)
CREATININE: 0.78 MG/DL (ref 0.5–0.97)
EGFR: 102 ML/MIN/1.73M2
EOSINOPHILS: 2 %
GLOBULIN: 2.7 G/DL (CALC) (ref 1.9–3.7)
GLUCOSE: 83 MG/DL (ref 65–99)
HEMATOCRIT: 38 % (ref 35–45)
HEMOGLOBIN: 12.5 G/DL (ref 11.7–15.5)
LYMPHOCYTES: 42.1 %
MCH: 29.6 PG (ref 27–33)
MCHC: 32.9 G/DL (ref 32–36)
MCV: 89.8 FL (ref 80–100)
MONOCYTES: 8.7 %
MPV: 9.6 FL (ref 7.5–12.5)
NEUTROPHILS: 46.8 %
PLATELET COUNT: 278 THOUSAND/UL (ref 140–400)
POTASSIUM: 3.7 MMOL/L (ref 3.5–5.3)
PROTEIN, TOTAL: 7 G/DL (ref 6.1–8.1)
RDW: 12.2 % (ref 11–15)
RED BLOOD CELL COUNT: 4.23 MILLION/UL (ref 3.8–5.1)
SODIUM: 140 MMOL/L (ref 135–146)
T4, FREE: 1.1 NG/DL (ref 0.8–1.8)
TSH: 2.52 MIU/L
WHITE BLOOD CELL COUNT: 5.4 THOUSAND/UL (ref 3.8–10.8)

## 2023-06-22 NOTE — PROGRESS NOTES
Normal white and red blood cell counts. Normal kidney and liver function testing. Normal blood sugar testing.   Normal thyroid testing  Sincerely,   Rubén Carlin PA-C

## 2023-07-19 ENCOUNTER — OFFICE VISIT (OUTPATIENT)
Dept: FAMILY MEDICINE CLINIC | Facility: CLINIC | Age: 33
End: 2023-07-19
Payer: MEDICAID

## 2023-07-19 VITALS
BODY MASS INDEX: 37.29 KG/M2 | RESPIRATION RATE: 18 BRPM | HEIGHT: 59 IN | OXYGEN SATURATION: 98 % | WEIGHT: 185 LBS | SYSTOLIC BLOOD PRESSURE: 142 MMHG | HEART RATE: 80 BPM | TEMPERATURE: 97 F | DIASTOLIC BLOOD PRESSURE: 100 MMHG

## 2023-07-19 DIAGNOSIS — E66.9 OBESITY, CLASS II, BMI 35-39.9, ISOLATED: ICD-10-CM

## 2023-07-19 DIAGNOSIS — Z79.899 MEDICATION MANAGEMENT: ICD-10-CM

## 2023-07-19 DIAGNOSIS — I10 PRIMARY HYPERTENSION: Primary | ICD-10-CM

## 2023-07-19 PROCEDURE — 99214 OFFICE O/P EST MOD 30 MIN: CPT | Performed by: FAMILY MEDICINE

## 2023-07-19 PROCEDURE — 3077F SYST BP >= 140 MM HG: CPT | Performed by: FAMILY MEDICINE

## 2023-07-19 PROCEDURE — 3008F BODY MASS INDEX DOCD: CPT | Performed by: FAMILY MEDICINE

## 2023-07-19 PROCEDURE — 3080F DIAST BP >= 90 MM HG: CPT | Performed by: FAMILY MEDICINE

## 2023-07-19 RX ORDER — LABETALOL 100 MG/1
100 TABLET, FILM COATED ORAL 2 TIMES DAILY
Qty: 60 TABLET | Refills: 1 | Status: CANCELLED | OUTPATIENT
Start: 2023-07-19

## 2023-07-19 RX ORDER — LABETALOL 300 MG/1
300 TABLET, FILM COATED ORAL 2 TIMES DAILY
Qty: 60 TABLET | Refills: 2 | Status: SHIPPED | OUTPATIENT
Start: 2023-07-19

## 2023-07-20 PROBLEM — Z87.59 HISTORY OF PRIOR PREGNANCY WITH IUGR NEWBORN: Status: RESOLVED | Noted: 2022-09-20 | Resolved: 2023-07-20

## 2023-09-05 ENCOUNTER — PATIENT MESSAGE (OUTPATIENT)
Dept: FAMILY MEDICINE CLINIC | Facility: CLINIC | Age: 33
End: 2023-09-05

## 2023-09-05 DIAGNOSIS — I10 PRIMARY HYPERTENSION: ICD-10-CM

## 2023-09-05 RX ORDER — LABETALOL 300 MG/1
300 TABLET, FILM COATED ORAL 2 TIMES DAILY
Qty: 60 TABLET | Refills: 2 | Status: SHIPPED | OUTPATIENT
Start: 2023-09-05

## 2023-10-18 ENCOUNTER — OFFICE VISIT (OUTPATIENT)
Dept: FAMILY MEDICINE CLINIC | Facility: CLINIC | Age: 33
End: 2023-10-18
Payer: MEDICAID

## 2023-10-18 VITALS
DIASTOLIC BLOOD PRESSURE: 80 MMHG | HEART RATE: 88 BPM | HEIGHT: 59 IN | SYSTOLIC BLOOD PRESSURE: 138 MMHG | BODY MASS INDEX: 36.08 KG/M2 | WEIGHT: 179 LBS | OXYGEN SATURATION: 98 % | TEMPERATURE: 97 F | RESPIRATION RATE: 18 BRPM

## 2023-10-18 DIAGNOSIS — I10 PRIMARY HYPERTENSION: Primary | ICD-10-CM

## 2023-10-18 DIAGNOSIS — E66.9 OBESITY, CLASS II, BMI 35-39.9, ISOLATED: ICD-10-CM

## 2023-10-18 PROCEDURE — 3075F SYST BP GE 130 - 139MM HG: CPT | Performed by: FAMILY MEDICINE

## 2023-10-18 PROCEDURE — 99213 OFFICE O/P EST LOW 20 MIN: CPT | Performed by: FAMILY MEDICINE

## 2023-10-18 PROCEDURE — 3008F BODY MASS INDEX DOCD: CPT | Performed by: FAMILY MEDICINE

## 2023-10-18 PROCEDURE — 3079F DIAST BP 80-89 MM HG: CPT | Performed by: FAMILY MEDICINE

## 2023-10-18 RX ORDER — LABETALOL 100 MG/1
100 TABLET, FILM COATED ORAL
Qty: 270 TABLET | Refills: 1 | Status: SHIPPED | OUTPATIENT
Start: 2023-10-18

## 2023-10-19 NOTE — PROGRESS NOTES
Note was reviewed and addended patient was seen by Shon Ferguson PA-C with  Liu Newton RN, APN student

## 2023-12-08 ENCOUNTER — PATIENT MESSAGE (OUTPATIENT)
Dept: FAMILY MEDICINE CLINIC | Facility: CLINIC | Age: 33
End: 2023-12-08

## 2023-12-08 ENCOUNTER — OFFICE VISIT (OUTPATIENT)
Dept: FAMILY MEDICINE CLINIC | Facility: CLINIC | Age: 33
End: 2023-12-08
Payer: MEDICAID

## 2023-12-08 VITALS
TEMPERATURE: 99 F | WEIGHT: 185 LBS | DIASTOLIC BLOOD PRESSURE: 76 MMHG | HEART RATE: 88 BPM | OXYGEN SATURATION: 98 % | SYSTOLIC BLOOD PRESSURE: 138 MMHG | HEIGHT: 59 IN | RESPIRATION RATE: 18 BRPM | BODY MASS INDEX: 37.29 KG/M2

## 2023-12-08 DIAGNOSIS — B97.89 SORE THROAT (VIRAL): ICD-10-CM

## 2023-12-08 DIAGNOSIS — J06.9 VIRAL UPPER RESPIRATORY INFECTION: Primary | ICD-10-CM

## 2023-12-08 DIAGNOSIS — J02.8 SORE THROAT (VIRAL): ICD-10-CM

## 2023-12-08 DIAGNOSIS — R09.81 CONGESTED NOSE: ICD-10-CM

## 2023-12-08 DIAGNOSIS — R09.89 CHEST CONGESTION: ICD-10-CM

## 2023-12-08 LAB
CONTROL LINE PRESENT WITH A CLEAR BACKGROUND (YES/NO): YES YES/NO
COVID19 BINAX NOW ANTIGEN: NOT DETECTED
KIT LOT #: NORMAL NUMERIC
OPERATOR ID: NORMAL
STREP GRP A CUL-SCR: NEGATIVE

## 2023-12-18 ENCOUNTER — OFFICE VISIT (OUTPATIENT)
Dept: FAMILY MEDICINE CLINIC | Facility: CLINIC | Age: 33
End: 2023-12-18
Payer: MEDICAID

## 2023-12-18 VITALS
WEIGHT: 187 LBS | TEMPERATURE: 98 F | RESPIRATION RATE: 18 BRPM | SYSTOLIC BLOOD PRESSURE: 160 MMHG | OXYGEN SATURATION: 99 % | HEART RATE: 76 BPM | DIASTOLIC BLOOD PRESSURE: 110 MMHG | BODY MASS INDEX: 37.7 KG/M2 | HEIGHT: 59 IN

## 2023-12-18 DIAGNOSIS — Z00.00 WELL FEMALE EXAM WITHOUT GYNECOLOGICAL EXAM: Primary | ICD-10-CM

## 2023-12-18 DIAGNOSIS — Z13.6 ENCOUNTER FOR LIPID SCREENING FOR CARDIOVASCULAR DISEASE: ICD-10-CM

## 2023-12-18 DIAGNOSIS — J06.9 RECENT URI: ICD-10-CM

## 2023-12-18 DIAGNOSIS — I10 PRIMARY HYPERTENSION: ICD-10-CM

## 2023-12-18 DIAGNOSIS — Z13.220 ENCOUNTER FOR LIPID SCREENING FOR CARDIOVASCULAR DISEASE: ICD-10-CM

## 2023-12-18 DIAGNOSIS — E66.9 OBESITY, CLASS II, BMI 35-39.9, ISOLATED: ICD-10-CM

## 2023-12-19 PROBLEM — O14.12 SEVERE PRE-ECLAMPSIA IN SECOND TRIMESTER: Status: RESOLVED | Noted: 2023-01-06 | Resolved: 2023-12-19

## 2023-12-19 PROBLEM — Z87.51 HISTORY OF PRETERM DELIVERY: Status: RESOLVED | Noted: 2022-09-19 | Resolved: 2023-12-19

## 2023-12-19 PROBLEM — O14.12: Status: RESOLVED | Noted: 2023-01-06 | Resolved: 2023-12-19

## 2023-12-19 PROBLEM — Z87.59 HISTORY OF GESTATIONAL HYPERTENSION: Status: RESOLVED | Noted: 2022-09-19 | Resolved: 2023-12-19

## 2023-12-19 PROBLEM — I10 PRIMARY HYPERTENSION: Status: ACTIVE | Noted: 2023-12-19

## 2023-12-19 RX ORDER — OLMESARTAN MEDOXOMIL 20 MG/1
20 TABLET ORAL DAILY
Qty: 30 TABLET | Refills: 1 | Status: SHIPPED | OUTPATIENT
Start: 2023-12-19

## 2023-12-19 RX ORDER — ACETAMINOPHEN AND CODEINE PHOSPHATE 120; 12 MG/5ML; MG/5ML
SOLUTION ORAL
Qty: 28 TABLET | Refills: 12 | Status: SHIPPED | OUTPATIENT
Start: 2023-12-19

## 2023-12-21 ENCOUNTER — TELEPHONE (OUTPATIENT)
Dept: FAMILY MEDICINE CLINIC | Facility: CLINIC | Age: 33
End: 2023-12-21

## 2023-12-21 NOTE — TELEPHONE ENCOUNTER
Signed form faxed to [facility] Department of Children and family service @ [phone]. My chart message sent to patient for form .

## 2024-01-11 ENCOUNTER — OFFICE VISIT (OUTPATIENT)
Dept: FAMILY MEDICINE CLINIC | Facility: CLINIC | Age: 34
End: 2024-01-11
Payer: MEDICAID

## 2024-01-11 ENCOUNTER — TELEPHONE (OUTPATIENT)
Dept: FAMILY MEDICINE CLINIC | Facility: CLINIC | Age: 34
End: 2024-01-11

## 2024-01-11 VITALS
DIASTOLIC BLOOD PRESSURE: 98 MMHG | BODY MASS INDEX: 36.29 KG/M2 | RESPIRATION RATE: 18 BRPM | HEART RATE: 100 BPM | HEIGHT: 59 IN | WEIGHT: 180 LBS | TEMPERATURE: 98 F | SYSTOLIC BLOOD PRESSURE: 130 MMHG | OXYGEN SATURATION: 100 %

## 2024-01-11 DIAGNOSIS — J02.9 ACUTE PHARYNGITIS, UNSPECIFIED ETIOLOGY: Primary | ICD-10-CM

## 2024-01-11 LAB
CONTROL LINE PRESENT WITH A CLEAR BACKGROUND (YES/NO): YES YES/NO
KIT LOT #: NORMAL NUMERIC
STREP GRP A CUL-SCR: NEGATIVE

## 2024-01-11 PROCEDURE — 87880 STREP A ASSAY W/OPTIC: CPT | Performed by: NURSE PRACTITIONER

## 2024-01-11 PROCEDURE — 99213 OFFICE O/P EST LOW 20 MIN: CPT | Performed by: NURSE PRACTITIONER

## 2024-01-11 PROCEDURE — 3008F BODY MASS INDEX DOCD: CPT | Performed by: NURSE PRACTITIONER

## 2024-01-11 PROCEDURE — 3080F DIAST BP >= 90 MM HG: CPT | Performed by: NURSE PRACTITIONER

## 2024-01-11 PROCEDURE — 3075F SYST BP GE 130 - 139MM HG: CPT | Performed by: NURSE PRACTITIONER

## 2024-01-11 NOTE — TELEPHONE ENCOUNTER
Received a fax from pharmacy stating the olmesartan medoxomil (benicar) is not preferred by insurance.  It will cost $122 monthly.  Pharmacy could not provide information regarding a covered alternative.  [employer] message sent to patient asking her to contact plan to find covered alternative to Olmesartan Medoxomil 20mg tab.

## 2024-01-11 NOTE — PROGRESS NOTES
CHIEF COMPLAINT:     Chief Complaint   Patient presents with    Sore Throat     The back of my throat and my entire throat is in pain it’s not a typical sore throat. It feels like my tongue is rubbing on sores in my throat. - Entered by patient         HPI:   Yandy Fisher is a 33 year old female presents to clinic with complaint of sore throat. Patient has had  a few  days. Symptoms have been better since onset.  Patient reports following associated symptoms: sore throat, congestion. Patient denies headache. Patient denies nausea.  Patient denies rash. Patient reports history of strep throat. Patient denies strep pharyngitis exposure. Treating symptoms with OTC pain reliever.    Current Outpatient Medications   Medication Sig Dispense Refill    olmesartan (BENICAR) 20 MG Oral Tab Take 1 tablet (20 mg total) by mouth daily. 30 tablet 1    Norethindrone 0.35 MG Oral Tab Take as directed (Patient not taking: Reported on 2024) 28 tablet 12    labetalol 100 MG Oral Tab Take 1 tablet (100 mg total) by mouth 3x Daily Cardiac. 270 tablet 1      Past Medical History:   Diagnosis Date    Allergic rhinitis     Esophageal reflux 2012    Essential hypertension -present    Pregnancy related    Fibroadenoma of breast, right     History of gestational hypertension     Vs CHTN  With IUGR and oligo last pregnancy, IOL at 35 weeks  [x ] ASA   [ ] Level 2 US  [ ] Serial growths  [x ] Baseline labs PCR 0.08 mg    History of  delivery     History of prior pregnancy with IUGR  2022    See GHTN tab    Obesity, Class II, BMI 35-39.9, isolated     Pregnancy-induced hypertension     Severe pre-eclampsia in second trimester       Social History:  Social History     Socioeconomic History    Marital status:    Tobacco Use    Smoking status: Never    Smokeless tobacco: Never    Tobacco comments:     None   Vaping Use    Vaping Use: Never used   Substance and Sexual Activity    Alcohol use: Not  Currently    Drug use: Never    Sexual activity: Yes     Partners: Male   Other Topics Concern     Service No    Blood Transfusions No    Caffeine Concern No    Occupational Exposure No    Hobby Hazards No    Sleep Concern No    Stress Concern No    Weight Concern No    Special Diet No    Back Care No    Exercise Yes    Bike Helmet No    Seat Belt Yes    Self-Exams No        REVIEW OF SYSTEMS:   GENERAL HEALTH: no change in appetite  SKIN: Per HPI  HEENT: denies ear pain, See HPI  RESPIRATORY: denies shortness of breath or wheezing  CARDIOVASCULAR: denies chest pain or palpitations   GI: denies vomiting or diarrhea  NEURO: denies dizziness or lightheadedness    EXAM:   BP (!) 130/98   Pulse 100   Temp 97.5 °F (36.4 °C) (Other)   Resp 18   Ht 4' 11\" (1.499 m)   Wt 180 lb (81.6 kg)   LMP 12/31/2023 (Approximate)   SpO2 100%   BMI 36.36 kg/m²   GENERAL: well developed, well nourished,in no apparent distress  SKIN: no rashes,no suspicious lesions  HEAD: atraumatic, normocephalic  EYES: conjunctiva clear, EOM intact  EARS: TM's clear, non-injected, no bulging, retraction, or fluid bilaterally  NOSE: nostrils patent, clear nasal discharge, nasal mucosa inflamed  THROAT: oral mucosa pink, moist. Posterior pharynx mildly erythematous and injected. no exudates.  Few clear ulcers to posterior pharynx.  Tonsils 2/4.  Breath is not malodorous   NECK: supple  LUNGS: clear to auscultation bilaterally, no wheezes or rhonchi. Breathing is non labored.  CARDIO: RRR without murmur  GI: good BS's,no masses, hepatosplenomegaly, or tenderness on direct palpation  EXTREMITIES: no cyanosis, clubbing or edema  LYMPH: + anterior cervical. no submandibular lymphadenopathy.  No posterior cervical or occipital lymphadenopathy.    Recent Results (from the past 24 hour(s))   Rapid Strep    Collection Time: 01/11/24  5:29 PM   Result Value Ref Range    Strep Grp A Screen negative Negative    Control Line Present with a clear  background (yes/no) yes Yes/No    Kit Lot # [id number] Numeric    Kit Expiration Date 01/31/2025 Date         ASSESSMENT AND PLAN:   Assessment:   Encounter Diagnosis   Name Primary?    Acute pharyngitis, unspecified etiology Yes       Plan: Discussed that due to symptoms and negative rapid strep this is most likely viral and does not require antibiotics.     Discussed possibility of mononucleosis infection, but at this time symptoms are not reflective of mono infection.  If s/sx persist will consider MonoSpot or further lab work.     Comfort measures explained and discussed as listed in Patient Instructions    Follow up with PCP in 3-5 days if not improving, condition worsens, or fever greater than or equal to 100.4 persists for 72 hours.    The patient/parent indicates understanding of these issues and agrees to the plan.    Patient Instructions   Cool liquids  Tylenol and ibuprofen as needed

## 2024-01-14 ENCOUNTER — PATIENT MESSAGE (OUTPATIENT)
Dept: FAMILY MEDICINE CLINIC | Facility: CLINIC | Age: 34
End: 2024-01-14

## 2024-01-14 DIAGNOSIS — J02.9 ULCERATIVE PHARYNGITIS: Primary | ICD-10-CM

## 2024-01-15 RX ORDER — PREDNISONE 20 MG/1
40 TABLET ORAL DAILY
Qty: 6 TABLET | Refills: 0 | Status: SHIPPED | OUTPATIENT
Start: 2024-01-15 | End: 2024-01-18

## 2024-01-15 RX ORDER — ACETAMINOPHEN AND CODEINE PHOSPHATE 120; 12 MG/5ML; MG/5ML
SOLUTION ORAL EVERY 6 HOURS
Qty: 160 ML | Refills: 0 | Status: SHIPPED | OUTPATIENT
Start: 2024-01-15 | End: 2024-01-19

## 2024-01-15 NOTE — TELEPHONE ENCOUNTER
From: Yandy Fisher  To: Anita Villeda  Sent: 1/14/2024 3:02 AM CST  Subject: Follow up    Carlos Anita, I went to the walk in clinic on January 11th and got my throat checked out. The nurse who was looking at my throat said it looked like there are canker sore, herpe like lesions on my throat and this is viral.     The pain is still intense. I don’t want to swallow. Tylenol does nothing for me. I have been drinking cool liquids- that’s temporary relief. I was told I can’t have antibiotics until 7 days in. Lidocaine was unavailable the nurse said. Listen, if there is something that will ease this pain, I am willing to take it.     I have never had anything like this in my life and I’m over it.

## 2024-04-11 ENCOUNTER — PATIENT MESSAGE (OUTPATIENT)
Dept: FAMILY MEDICINE CLINIC | Facility: CLINIC | Age: 34
End: 2024-04-11

## 2024-04-11 NOTE — TELEPHONE ENCOUNTER
From: Yandy Fisher  To: Anita Villeda  Sent: 4/11/2024 9:29 AM CDT  Subject: Wrist pain     Gerardo Howard! I’ve been experiencing wrist pain for about a week. I don’t know if I sprained it or what. It’s in pain when I rotate it and perform daily tasks. I can have my wrist in a resting position and I feel pain as well. When I twist my wrist it feels like my bone is popping. I have felt numbness and even pain to my elbow. I have it in a brace at the moment. Can I have a referral for an ultrasound or x-ray? Whatever procedure will likely show what I did to it will be very helpful. Thank you!

## 2024-04-22 ENCOUNTER — TELEPHONE (OUTPATIENT)
Dept: FAMILY MEDICINE CLINIC | Facility: CLINIC | Age: 34
End: 2024-04-22

## 2024-04-22 NOTE — TELEPHONE ENCOUNTER
Left voicemail reminding patient due for 6 month follow up with Anita Villeda.  Informed to return call or schedule through [employer].

## 2024-04-29 ENCOUNTER — OFFICE VISIT (OUTPATIENT)
Dept: FAMILY MEDICINE CLINIC | Facility: CLINIC | Age: 34
End: 2024-04-29

## 2024-04-29 ENCOUNTER — TELEPHONE (OUTPATIENT)
Dept: FAMILY MEDICINE CLINIC | Facility: CLINIC | Age: 34
End: 2024-04-29

## 2024-04-29 VITALS
TEMPERATURE: 97 F | WEIGHT: 170 LBS | HEART RATE: 81 BPM | SYSTOLIC BLOOD PRESSURE: 155 MMHG | DIASTOLIC BLOOD PRESSURE: 114 MMHG | RESPIRATION RATE: 16 BRPM | HEIGHT: 59 IN | BODY MASS INDEX: 34.27 KG/M2 | OXYGEN SATURATION: 98 %

## 2024-04-29 DIAGNOSIS — N30.00 ACUTE CYSTITIS WITHOUT HEMATURIA: ICD-10-CM

## 2024-04-29 DIAGNOSIS — R39.9 UTI SYMPTOMS: Primary | ICD-10-CM

## 2024-04-29 DIAGNOSIS — R03.0 ELEVATED BLOOD PRESSURE READING: ICD-10-CM

## 2024-04-29 LAB
APPEARANCE: CLEAR
BILIRUBIN: NEGATIVE
GLUCOSE (URINE DIPSTICK): NEGATIVE MG/DL
KETONES (URINE DIPSTICK): NEGATIVE MG/DL
MULTISTIX LOT#: ABNORMAL NUMERIC
NITRITE, URINE: NEGATIVE
PH, URINE: 7.5 (ref 4.5–8)
PROTEIN (URINE DIPSTICK): 100 MG/DL
SPECIFIC GRAVITY: 1.02 (ref 1–1.03)
UROBILINOGEN,SEMI-QN: 1 MG/DL (ref 0–1.9)

## 2024-04-29 RX ORDER — NITROFURANTOIN 25; 75 MG/1; MG/1
100 CAPSULE ORAL 2 TIMES DAILY
Qty: 14 CAPSULE | Refills: 0 | Status: SHIPPED | OUTPATIENT
Start: 2024-04-29 | End: 2024-05-06

## 2024-04-29 NOTE — TELEPHONE ENCOUNTER
There are no appointment with Anita Villeda PA-C until May 22  Are you advising just to use [facility]?  Please advise.

## 2024-04-29 NOTE — TELEPHONE ENCOUNTER
Patient calling with Possible UTI, symptoms started yesterday Would like appointment. With NABILA Chapa. -   Please advise.

## 2024-04-30 NOTE — PATIENT INSTRUCTIONS
Take antibiotics with food and plenty of water.  Eat yogurt daily or use probiotics. (Align is a good example of an OTC probiotic)  Make sure to finish the entire antibiotic treatment.  You may take otc pyridium for urinary discomfort if needed.   Increase fluid intake and bladder emptying, especially after intercourse.   Return in 3 days if not better. Call if fever, vomiting, worsening symptoms.    Take your missed dose of blood pressure when you return home this afternoon.   Monitor your blood pressure at home and contact your PCP if your numbers remain elevated.   If you develop chest pain, shortness of breath or dizziness, please go to the ED for urgent evaluation.

## 2024-04-30 NOTE — PROGRESS NOTES
Yandy Fisher is a 34 year old female.  Chief Complaint   Patient presents with    Urinary Symptoms     X 1 day, bladder pressure, burning, frequency, urgency, no otc       HPI:   Patient presents with symptoms of UTI for 1 days.   Complaining of urinary frequency, urgency, dysuria, no suprapubic pain.   Denies back pain, fever. Reports blood in urine, but pt is on menses now.   Pt has remote history of UTI in past.    Current Outpatient Medications   Medication Sig Dispense Refill    nitrofurantoin monohydrate macro 100 MG Oral Cap Take 1 capsule (100 mg total) by mouth 2 (two) times daily for 7 days. 14 capsule 0    olmesartan (BENICAR) 20 MG Oral Tab Take 1 tablet (20 mg total) by mouth daily. 30 tablet 1    Norethindrone 0.35 MG Oral Tab Take as directed (Patient not taking: Reported on 2024) 28 tablet 12    labetalol 100 MG Oral Tab Take 1 tablet (100 mg total) by mouth 3x Daily Cardiac. 270 tablet 1     No current facility-administered medications for this visit.      Past Medical History:    Allergic rhinitis    Esophageal reflux    Essential hypertension    Pregnancy related    Fibroadenoma of breast, right    History of gestational hypertension    Vs CHTN  With IUGR and oligo last pregnancy, IOL at 35 weeks  [x ] ASA   [ ] Level 2 US  [ ] Serial growths  [x ] Baseline labs PCR 0.08 mg    History of  delivery    History of prior pregnancy with IUGR     See GHTN tab    Obesity, Class II, BMI 35-39.9, isolated    Pregnancy-induced hypertension (HCC)    Severe pre-eclampsia in second trimester (HCC)      Social History:  Social History     Socioeconomic History    Marital status:    Tobacco Use    Smoking status: Never    Smokeless tobacco: Never    Tobacco comments:     None   Vaping Use    Vaping status: Never Used   Substance and Sexual Activity    Alcohol use: Not Currently    Drug use: Never    Sexual activity: Yes     Partners: Male   Other Topics Concern     Service  No    Blood Transfusions No    Caffeine Concern No    Occupational Exposure No    Hobby Hazards No    Sleep Concern No    Stress Concern No    Weight Concern No    Special Diet No    Back Care No    Exercise Yes    Bike Helmet No    Seat Belt Yes    Self-Exams No     Social Determinants of Health     Financial Resource Strain: Low Risk  (1/17/2023)    Received from [employer]    Overall Financial Resource Strain (CARDIA)     Difficulty of Paying Living Expenses: Not very hard   Food Insecurity: No Food Insecurity (1/17/2023)    Received from [employer]    Food Insecurity     Within the past 12 months, you worried that your food would run out before you got the money to buy more.: 1     Within the past 12 months, the food you bought just didn't last and you didn't have money to get more.: 1   Transportation Needs: No Transportation Needs (1/17/2023)    Received from [employer]    Transportation Needs     In the past 12 months, has lack of transportation kept you from medical appointments or from getting medications?: 2     In the past 12 months, has lack of transportation kept you from meetings, work, or from getting things needed for daily living?: 2   Stress: Stress Concern Present (1/17/2023)    Received from [employer]    [facility] [employer] [facility] - Occupational Stress Questionnaire     Feeling of Stress : To some extent    Received from [employer]    [facility]         REVIEW OF SYSTEMS:   GENERAL HEALTH: no  fever/chills or fatigue  SKIN: denies any unusual skin lesions or rashes  RESPIRATORY: no shortness of breath with exertion  CARDIOVASCULAR: denies chest pain on exertion and palpitations.  GI: no nausea or vomiting  : as above.  NEURO: denies headaches or dizziness.    EXAM:   BP (!) 155/114 (BP Location: Right arm, Patient Position: Sitting, Cuff Size: large)   Pulse 81   Temp 97.3 °F (36.3 °C)   Resp 16    Ht 4' 11\" (1.499 m)   Wt 170 lb (77.1 kg)   LMP 04/28/2024 (Exact Date)   SpO2 98%   BMI 34.34 kg/m²   GENERAL: well developed, well nourished,in no apparent distress, pleasant pt.  SKIN: no rashes,no suspicious lesions  LUNG: Clear to auscultation bilaterally. No W/R/R.  HEART: RRR, no murmur.  GI: normoactive BS x4, no masses, HSM; no suprapubic tenderness,no CVAT    RESULTS:      Recent Results (from the past 24 hour(s))   Urine Dip, auto without Micro    Collection Time: 04/29/24  4:46 PM   Result Value Ref Range    Glucose Urine Negative Negative mg/dL    Bilirubin Urine Negative Negative    Ketones, UA Negative Negative - Trace mg/dL    Spec Gravity 1.020 (A) 1.005 - 1.030    Blood Urine Large (A) Negative    PH Urine 7.5 5.0 - 8.0    Protein Urine 100 (A) Negative - Trace mg/dL    Urobilinogen Urine 1.0 0.2 - 1.0 mg/dL    Nitrite Urine Negative Negative    Leukocyte Esterase Urine Small (A) Negative    APPEARANCE clear Clear    Color Urine red Yellow    Mult[employer] Lot# [id number] Numeric    Mult[employer] Expiration Date 8/31/24 Date       ASSESSMENT AND PLAN:     Encounter Diagnoses   Name Primary?    UTI symptoms Yes    Acute cystitis without hematuria     Elevated blood pressure reading        Orders Placed This Encounter   Procedures    Urine Dip, auto without Micro    Urine Culture, Routine [E]       Meds & Refills for this Visit:  Requested Prescriptions     Signed Prescriptions Disp Refills    nitrofurantoin monohydrate macro 100 MG Oral Cap 14 capsule 0     Sig: Take 1 capsule (100 mg total) by mouth 2 (two) times daily for 7 days.         Patient Instructions   Take antibiotics with food and plenty of water.  Eat yogurt daily or use probiotics. (Align is a good example of an OTC probiotic)  Make sure to finish the entire antibiotic treatment.  You may take otc pyridium for urinary discomfort if needed.   Increase fluid intake and bladder emptying, especially after intercourse.   Return in 3 days if not  better. Call if fever, vomiting, worsening symptoms.    Take your missed dose of blood pressure when you return home this afternoon.   Monitor your blood pressure at home and contact your PCP if your numbers remain elevated.   If you develop chest pain, shortness of breath or dizziness, please go to the ED for urgent evaluation.   The patient indicates understanding of these issues and agrees to the plan.

## 2024-09-24 ENCOUNTER — TELEPHONE (OUTPATIENT)
Dept: CASE MANAGEMENT | Age: 34
End: 2024-09-24

## 2024-11-06 NOTE — TELEPHONE ENCOUNTER
Left patient message to call office to schedule Hypertension follow-up appointment, second attempt.

## 2024-11-08 NOTE — TELEPHONE ENCOUNTER
Left patient message to call office to schedule Hypertension follow-up appointment, third attempt.  Closing encounter.

## (undated) NOTE — LETTER
Date: 12/18/2023    Patient Name: Corrinne Camps          To Whom it may concern: This letter has been written at the patient's request.     The patient may return to work/school on 12/18/2023 with no limitations.       Sincerely,    Brycehaylie Calle PA-C

## (undated) NOTE — LETTER
04/29/24    Patient Name:  Yandy Fisher        To Whom it may concern:    This is to inform you that Yandy Fisher was evaluated in the office today for her symptoms.      Sincerely,     Belinda Hilario PA-C

## (undated) NOTE — LETTER
Dear new mom:    Sorry, we missed you! The nurses of [facility] have tried to reach you by phone to ask if you have any questions regarding your health or the health and care of your new little one.     We hope you are doing moms and their babies (up to 7 months of age). Relatives and friends are welcome to attend with the new mom. Includes breastfeeding support with an IBCLC (lactation consultant) who is available to answer your breastfeeding questions.     Mom & Baby Hour (El go to [email or web address] or call [phone].  [facility] [address]: [phone]  • Molly: [phone]    [facility]  [employer]en Moms—Celebrating motherhood.  For expec

## (undated) NOTE — LETTER
12/09/21    Re: Lindsay Query         1/13/1990      To whom it may concern: This letter is to inform you that Lindsay Hicks has delivered a healthy infant via vaginal delivery on 12/3/21.  She was admitted to the hospital on 11/30/21 due to pregnanc